# Patient Record
Sex: FEMALE | Race: WHITE | NOT HISPANIC OR LATINO | Employment: OTHER | URBAN - METROPOLITAN AREA
[De-identification: names, ages, dates, MRNs, and addresses within clinical notes are randomized per-mention and may not be internally consistent; named-entity substitution may affect disease eponyms.]

---

## 2017-04-03 ENCOUNTER — OFFICE VISIT (OUTPATIENT)
Dept: RETAIL CLINIC | Facility: CLINIC | Age: 77
End: 2017-04-03

## 2017-04-03 VITALS
OXYGEN SATURATION: 96 % | SYSTOLIC BLOOD PRESSURE: 140 MMHG | DIASTOLIC BLOOD PRESSURE: 82 MMHG | TEMPERATURE: 97.3 F | RESPIRATION RATE: 16 BRPM | HEART RATE: 65 BPM

## 2017-04-03 DIAGNOSIS — H66.001 ACUTE SUPPURATIVE OTITIS MEDIA OF RIGHT EAR WITHOUT SPONTANEOUS RUPTURE OF TYMPANIC MEMBRANE, RECURRENCE NOT SPECIFIED: Primary | ICD-10-CM

## 2017-04-03 DIAGNOSIS — J40 BRONCHITIS: ICD-10-CM

## 2017-04-03 PROBLEM — H92.09 EAR ACHE: Status: ACTIVE | Noted: 2017-04-03

## 2017-04-03 PROBLEM — R50.9 FEVER: Status: ACTIVE | Noted: 2017-04-03

## 2017-04-03 PROBLEM — R09.89 CHEST CONGESTION: Status: ACTIVE | Noted: 2017-04-03

## 2017-04-03 PROCEDURE — 99203 OFFICE O/P NEW LOW 30 MIN: CPT | Performed by: NURSE PRACTITIONER

## 2017-04-03 RX ORDER — BENZONATATE 100 MG/1
100 CAPSULE ORAL 3 TIMES DAILY PRN
Qty: 18 CAPSULE | Refills: 0 | Status: SHIPPED | OUTPATIENT
Start: 2017-04-03 | End: 2023-01-01 | Stop reason: HOSPADM

## 2017-04-03 RX ORDER — LANSOPRAZOLE 15 MG/1
15 CAPSULE, DELAYED RELEASE ORAL DAILY
COMMUNITY

## 2017-04-03 RX ORDER — ALBUTEROL SULFATE 90 UG/1
2 AEROSOL, METERED RESPIRATORY (INHALATION) EVERY 4 HOURS PRN
Qty: 1 INHALER | Refills: 0 | Status: SHIPPED | OUTPATIENT
Start: 2017-04-03

## 2017-04-03 RX ORDER — SIMVASTATIN 40 MG
40 TABLET ORAL NIGHTLY
COMMUNITY
End: 2023-01-01 | Stop reason: HOSPADM

## 2017-04-03 RX ORDER — AMOXICILLIN 875 MG/1
875 TABLET, COATED ORAL 2 TIMES DAILY
Qty: 20 TABLET | Refills: 0 | Status: SHIPPED | OUTPATIENT
Start: 2017-04-03 | End: 2023-01-01 | Stop reason: HOSPADM

## 2017-04-03 RX ORDER — PREDNISONE 1 MG/1
TABLET ORAL
Qty: 21 TABLET | Refills: 0 | Status: SHIPPED | OUTPATIENT
Start: 2017-04-03 | End: 2023-01-01 | Stop reason: HOSPADM

## 2017-04-03 RX ORDER — PHENYTOIN 50 MG/1
50 TABLET, CHEWABLE ORAL 3 TIMES DAILY
COMMUNITY
End: 2023-01-01 | Stop reason: HOSPADM

## 2017-04-03 NOTE — PROGRESS NOTES
See Bryant is a 77 y.o. female.     URI    This is a new problem. The current episode started in the past 7 days. The maximum temperature recorded prior to her arrival was 100.4 - 100.9 F. The fever has been present for 1 to 2 days. Associated symptoms include congestion, coughing, ear pain, headaches and wheezing. Associated symptoms comments: Right ear  Facial pressure. She has tried acetaminophen for the symptoms.   Cough   Associated symptoms include chills, ear pain, a fever, headaches, postnasal drip and wheezing.   Earache    There is pain in the right ear. This is a new problem. The current episode started yesterday. The maximum temperature recorded prior to her arrival was 100.4 - 100.9 F. The fever has been present for 1 to 2 days. The pain is at a severity of 5/10. The pain is moderate. Associated symptoms include coughing and headaches. Pertinent negatives include no ear discharge.        The following portions of the patient's history were reviewed and updated as appropriate: allergies, current medications, past family history, past medical history, past social history, past surgical history and problem list.    Review of Systems   Constitutional: Positive for chills and fever.   HENT: Positive for congestion, ear pain and postnasal drip. Negative for ear discharge.         Right ear   Eyes: Negative.    Respiratory: Positive for cough, chest tightness and wheezing.         Chest congestion   Cardiovascular: Negative.    Gastrointestinal: Negative.    Neurological: Positive for headaches.       Objective   Physical Exam   Constitutional: She is oriented to person, place, and time. She appears well-developed and well-nourished.   HENT:   Head: Normocephalic and atraumatic.   Right Ear: Tympanic membrane is injected, erythematous and bulging. Tympanic membrane mobility is abnormal.   Left Ear: Hearing and external ear normal.   Nose: Mucosal edema present.   Mouth/Throat: No oropharyngeal  exudate, posterior oropharyngeal edema, posterior oropharyngeal erythema or tonsillar abscesses.   Eyes: Pupils are equal, round, and reactive to light.   Neck: Normal range of motion.   Cardiovascular: Normal rate and regular rhythm.    Pulmonary/Chest: No respiratory distress. She has no decreased breath sounds. She has wheezes in the right upper field and the left upper field. She has rhonchi in the right upper field, the right middle field and the left upper field. She has no rales. She exhibits tenderness.   Abdominal: Soft. Bowel sounds are normal.   Neurological: She is oriented to person, place, and time.   Nursing note and vitals reviewed.      Assessment/Plan   Ina was seen today for uri, cough and earache.    Diagnoses and all orders for this visit:    Acute suppurative otitis media of right ear without spontaneous rupture of tympanic membrane, recurrence not specified  -     amoxicillin (AMOXIL) 875 MG tablet; Take 1 tablet by mouth 2 (Two) Times a Day.    Bronchitis  -     predniSONE (DELTASONE) 5 MG tablet; 5 mg pack  -     benzonatate (TESSALON PERLES) 100 MG capsule; Take 1 capsule by mouth 3 (Three) Times a Day As Needed for Cough.  -     albuterol (PROVENTIL HFA) 108 (90 BASE) MCG/ACT inhaler; Inhale 2 puffs Every 4 (Four) Hours As Needed for Wheezing or Shortness of Air.      Talked to the patient about the diagnosis and educate the patient and advise to visit to PCP if the symptoms worsens

## 2017-04-03 NOTE — PATIENT INSTRUCTIONS
Otitis Media, Adult  Otitis media is redness, soreness, and inflammation of the middle ear. Otitis media may be caused by allergies or, most commonly, by infection. Often it occurs as a complication of the common cold.  SIGNS AND SYMPTOMS  Symptoms of otitis media may include:  · Earache.  · Fever.  · Ringing in your ear.  · Headache.  · Leakage of fluid from the ear.  DIAGNOSIS  To diagnose otitis media, your health care provider will examine your ear with an otoscope. This is an instrument that allows your health care provider to see into your ear in order to examine your eardrum. Your health care provider also will ask you questions about your symptoms.  TREATMENT   Typically, otitis media resolves on its own within 3-5 days. Your health care provider may prescribe medicine to ease your symptoms of pain. If otitis media does not resolve within 5 days or is recurrent, your health care provider may prescribe antibiotic medicines if he or she suspects that a bacterial infection is the cause.  HOME CARE INSTRUCTIONS   · If you were prescribed an antibiotic medicine, finish it all even if you start to feel better.  · Take medicines only as directed by your health care provider.  · Keep all follow-up visits as directed by your health care provider.  SEEK MEDICAL CARE IF:  · You have otitis media only in one ear, or bleeding from your nose, or both.  · You notice a lump on your neck.  · You are not getting better in 3-5 days.  · You feel worse instead of better.  SEEK IMMEDIATE MEDICAL CARE IF:   · You have pain that is not controlled with medicine.  · You have swelling, redness, or pain around your ear or stiffness in your neck.  · You notice that part of your face is paralyzed.  · You notice that the bone behind your ear (mastoid) is tender when you touch it.  MAKE SURE YOU:   · Understand these instructions.  · Will watch your condition.  · Will get help right away if you are not doing well or get worse.     This  information is not intended to replace advice given to you by your health care provider. Make sure you discuss any questions you have with your health care provider.     Document Released: 09/22/2005 Document Revised: 01/08/2016 Document Reviewed: 07/15/2014  [employer] Interactive Patient Education ©2016 [employer]    Acute Bronchitis  Bronchitis is inflammation of the airways that extend from the windpipe into the lungs (bronchi). The inflammation often causes mucus to develop. This leads to a cough, which is the most common symptom of bronchitis.   In acute bronchitis, the condition usually develops suddenly and goes away over time, usually in a couple weeks. Smoking, allergies, and asthma can make bronchitis worse. Repeated episodes of bronchitis may cause further lung problems.   CAUSES  Acute bronchitis is most often caused by the same virus that causes a cold. The virus can spread from person to person (contagious) through coughing, sneezing, and touching contaminated objects.  SIGNS AND SYMPTOMS   · Cough.    · Fever.    · Coughing up mucus.    · Body aches.    · Chest congestion.    · Chills.    · Shortness of breath.    · Sore throat.    DIAGNOSIS   Acute bronchitis is usually diagnosed through a physical exam. Your health care provider will also ask you questions about your medical history. Tests, such as chest X-rays, are sometimes done to rule out other conditions.   TREATMENT   Acute bronchitis usually goes away in a couple weeks. Oftentimes, no medical treatment is necessary. Medicines are sometimes given for relief of fever or cough. Antibiotic medicines are usually not needed but may be prescribed in certain situations. In some cases, an inhaler may be recommended to help reduce shortness of breath and control the cough. A cool mist vaporizer may also be used to help thin bronchial secretions and make it easier to clear the chest.   HOME CARE INSTRUCTIONS  · Get plenty of rest.    · Drink enough  fluids to keep your urine clear or pale yellow (unless you have a medical condition that requires fluid restriction). Increasing fluids may help thin your respiratory secretions (sputum) and reduce chest congestion, and it will prevent dehydration.    · Take medicines only as directed by your health care provider.  · If you were prescribed an antibiotic medicine, finish it all even if you start to feel better.  · Avoid smoking and secondhand smoke. Exposure to cigarette smoke or irritating chemicals will make bronchitis worse. If you are a smoker, consider using nicotine gum or skin patches to help control withdrawal symptoms. Quitting smoking will help your lungs heal faster.    · Reduce the chances of another bout of acute bronchitis by washing your hands frequently, avoiding people with cold symptoms, and trying not to touch your hands to your mouth, nose, or eyes.    · Keep all follow-up visits as directed by your health care provider.    SEEK MEDICAL CARE IF:  Your symptoms do not improve after 1 week of treatment.   SEEK IMMEDIATE MEDICAL CARE IF:  · You develop an increased fever or chills.    · You have chest pain.    · You have severe shortness of breath.  · You have bloody sputum.    · You develop dehydration.  · You faint or repeatedly feel like you are going to pass out.  · You develop repeated vomiting.  · You develop a severe headache.  MAKE SURE YOU:   · Understand these instructions.  · Will watch your condition.  · Will get help right away if you are not doing well or get worse.     This information is not intended to replace advice given to you by your health care provider. Make sure you discuss any questions you have with your health care provider.     Document Released: 01/25/2006 Document Revised: 01/08/2016 Document Reviewed: 06/10/2014  [employer] Patient Education ©2016 [employer]  Talked to the patient about the diagnosis and educate the patient and advise to visit to PCP if the  symptoms worsens

## 2023-01-01 ENCOUNTER — APPOINTMENT (OUTPATIENT)
Dept: CARDIOLOGY | Facility: HOSPITAL | Age: 83
DRG: 65 | End: 2023-01-01
Payer: MEDICARE

## 2023-01-01 ENCOUNTER — APPOINTMENT (OUTPATIENT)
Dept: MRI IMAGING | Facility: HOSPITAL | Age: 83
DRG: 65 | End: 2023-01-01
Payer: MEDICARE

## 2023-01-01 ENCOUNTER — APPOINTMENT (OUTPATIENT)
Dept: GENERAL RADIOLOGY | Facility: HOSPITAL | Age: 83
DRG: 65 | End: 2023-01-01
Payer: MEDICARE

## 2023-01-01 ENCOUNTER — APPOINTMENT (OUTPATIENT)
Dept: GENERAL RADIOLOGY | Facility: HOSPITAL | Age: 83
DRG: 297 | End: 2023-01-01
Payer: MEDICARE

## 2023-01-01 ENCOUNTER — APPOINTMENT (OUTPATIENT)
Dept: CT IMAGING | Facility: HOSPITAL | Age: 83
DRG: 65 | End: 2023-01-01
Payer: MEDICARE

## 2023-01-01 ENCOUNTER — HOSPITAL ENCOUNTER (INPATIENT)
Facility: HOSPITAL | Age: 83
LOS: 6 days | Discharge: NURSING, REHAB OR LONG-TERM CARE | DRG: 65 | End: 2023-02-28
Attending: EMERGENCY MEDICINE
Payer: MEDICARE

## 2023-01-01 ENCOUNTER — APPOINTMENT (OUTPATIENT)
Dept: CT IMAGING | Facility: HOSPITAL | Age: 83
DRG: 297 | End: 2023-01-01
Payer: MEDICARE

## 2023-01-01 ENCOUNTER — HOSPITAL ENCOUNTER (INPATIENT)
Facility: HOSPITAL | Age: 83
LOS: 2 days | DRG: 297 | End: 2023-03-12
Attending: EMERGENCY MEDICINE | Admitting: HOSPITALIST
Payer: MEDICARE

## 2023-01-01 ENCOUNTER — HOSPITAL ENCOUNTER (EMERGENCY)
Facility: HOSPITAL | Age: 83
Discharge: HOME | DRG: 65 | End: 2023-02-22
Attending: EMERGENCY MEDICINE
Payer: MEDICARE

## 2023-01-01 VITALS
TEMPERATURE: 97.6 F | HEIGHT: 62 IN | RESPIRATION RATE: 16 BRPM | HEART RATE: 61 BPM | OXYGEN SATURATION: 97 % | WEIGHT: 162 LBS | DIASTOLIC BLOOD PRESSURE: 62 MMHG | SYSTOLIC BLOOD PRESSURE: 133 MMHG | BODY MASS INDEX: 29.81 KG/M2

## 2023-01-01 VITALS
BODY MASS INDEX: 29.81 KG/M2 | RESPIRATION RATE: 16 BRPM | HEART RATE: 111 BPM | HEIGHT: 62 IN | OXYGEN SATURATION: 89 % | SYSTOLIC BLOOD PRESSURE: 128 MMHG | DIASTOLIC BLOOD PRESSURE: 66 MMHG | WEIGHT: 162 LBS | TEMPERATURE: 97.7 F

## 2023-01-01 VITALS
OXYGEN SATURATION: 96 % | HEART RATE: 38 BPM | RESPIRATION RATE: 18 BRPM | TEMPERATURE: 97.4 F | DIASTOLIC BLOOD PRESSURE: 64 MMHG | SYSTOLIC BLOOD PRESSURE: 110 MMHG

## 2023-01-01 DIAGNOSIS — E87.6 HYPOKALEMIA: ICD-10-CM

## 2023-01-01 DIAGNOSIS — R00.1 SYMPTOMATIC BRADYCARDIA: ICD-10-CM

## 2023-01-01 DIAGNOSIS — E83.42 HYPOMAGNESEMIA: ICD-10-CM

## 2023-01-01 DIAGNOSIS — I95.9 SYMPTOMATIC HYPOTENSION: ICD-10-CM

## 2023-01-01 DIAGNOSIS — S22.43XA MULTIPLE FRACTURES OF RIBS, BILATERAL, INIT FOR CLOS FX: ICD-10-CM

## 2023-01-01 DIAGNOSIS — Z86.69 HISTORY OF SEIZURE DISORDER: ICD-10-CM

## 2023-01-01 DIAGNOSIS — R55 SYNCOPE AND COLLAPSE: Primary | ICD-10-CM

## 2023-01-01 DIAGNOSIS — N39.0 URINARY TRACT INFECTION WITHOUT HEMATURIA, SITE UNSPECIFIED: ICD-10-CM

## 2023-01-01 DIAGNOSIS — I48.91 ATRIAL FIBRILLATION WITH SLOW VENTRICULAR RESPONSE: Primary | ICD-10-CM

## 2023-01-01 DIAGNOSIS — R77.8 ELEVATED TROPONIN: ICD-10-CM

## 2023-01-01 DIAGNOSIS — Z86.79 HISTORY OF ATRIAL FIBRILLATION: ICD-10-CM

## 2023-01-01 DIAGNOSIS — E83.51 HYPOCALCEMIA: ICD-10-CM

## 2023-01-01 DIAGNOSIS — R56.9 SEIZURES: ICD-10-CM

## 2023-01-01 DIAGNOSIS — S22.20XA CLOSED FRACTURE OF STERNUM, UNSPECIFIED PORTION OF STERNUM, INITIAL ENCOUNTER: ICD-10-CM

## 2023-01-01 DIAGNOSIS — R51.9 ACUTE NONINTRACTABLE HEADACHE, UNSPECIFIED HEADACHE TYPE: ICD-10-CM

## 2023-01-01 LAB
ALBUMIN SERPL-MCNC: 2.3 G/DL (ref 3.5–5.2)
ALBUMIN SERPL-MCNC: 3.1 G/DL (ref 3.5–5.2)
ALBUMIN/GLOB SERPL: 1.2 G/DL
ALBUMIN/GLOB SERPL: 1.2 G/DL
ALP SERPL-CCNC: 124 U/L (ref 39–117)
ALP SERPL-CCNC: 88 U/L (ref 39–117)
ALT SERPL W P-5'-P-CCNC: 11 U/L (ref 1–33)
ALT SERPL W P-5'-P-CCNC: 11 U/L (ref 1–33)
ANION GAP SERPL CALCULATED.3IONS-SCNC: 10.8 MMOL/L (ref 5–15)
ANION GAP SERPL CALCULATED.3IONS-SCNC: 7 MMOL/L (ref 5–15)
ANION GAP SERPL CALCULATED.3IONS-SCNC: 7.4 MMOL/L (ref 5–15)
ANION GAP SERPL CALCULATED.3IONS-SCNC: 8 MMOL/L (ref 5–15)
ANION GAP SERPL CALCULATED.3IONS-SCNC: 9 MMOL/L (ref 5–15)
ANION GAP SERPL CALCULATED.3IONS-SCNC: 9.4 MMOL/L (ref 5–15)
AORTIC DIMENSIONLESS INDEX: 0.8 (DI)
APTT PPP: 24.6 SECONDS (ref 22.7–35.4)
ARTERIAL PATENCY WRIST A: POSITIVE
ASCENDING AORTA: 2.6 CM
AST SERPL-CCNC: 15 U/L (ref 1–32)
AST SERPL-CCNC: 16 U/L (ref 1–32)
ATMOSPHERIC PRESS: 749.2 MMHG
BACTERIA SPEC AEROBE CULT: ABNORMAL
BACTERIA UR QL AUTO: ABNORMAL /HPF
BACTERIA UR QL AUTO: ABNORMAL /HPF
BASE EXCESS BLDA CALC-SCNC: -0.9 MMOL/L (ref 0–2)
BASOPHILS # BLD AUTO: 0.02 10*3/MM3 (ref 0–0.2)
BASOPHILS # BLD AUTO: 0.03 10*3/MM3 (ref 0–0.2)
BASOPHILS # BLD AUTO: 0.05 10*3/MM3 (ref 0–0.2)
BASOPHILS # BLD AUTO: 0.06 10*3/MM3 (ref 0–0.2)
BASOPHILS # BLD AUTO: 0.07 10*3/MM3 (ref 0–0.2)
BASOPHILS # BLD AUTO: 0.07 10*3/MM3 (ref 0–0.2)
BASOPHILS NFR BLD AUTO: 0.3 % (ref 0–1.5)
BASOPHILS NFR BLD AUTO: 0.6 % (ref 0–1.5)
BASOPHILS NFR BLD AUTO: 0.7 % (ref 0–1.5)
BASOPHILS NFR BLD AUTO: 0.8 % (ref 0–1.5)
BDY SITE: ABNORMAL
BH CV ECHO MEAS - ACS: 1.87 CM
BH CV ECHO MEAS - AI P1/2T: 439.4 MSEC
BH CV ECHO MEAS - AO MAX PG: 4.9 MMHG
BH CV ECHO MEAS - AO MEAN PG: 2.7 MMHG
BH CV ECHO MEAS - AO ROOT DIAM: 2.9 CM
BH CV ECHO MEAS - AO V2 MAX: 110.9 CM/SEC
BH CV ECHO MEAS - AO V2 VTI: 27.9 CM
BH CV ECHO MEAS - AVA(I,D): 1.96 CM2
BH CV ECHO MEAS - EDV(CUBED): 113.2 ML
BH CV ECHO MEAS - EDV(MOD-SP2): 78 ML
BH CV ECHO MEAS - EDV(MOD-SP4): 62 ML
BH CV ECHO MEAS - EF(MOD-BP): 63.4 %
BH CV ECHO MEAS - EF(MOD-SP2): 65.4 %
BH CV ECHO MEAS - EF(MOD-SP4): 62.9 %
BH CV ECHO MEAS - EF_3D-VOL: 67 %
BH CV ECHO MEAS - ESV(CUBED): 25.8 ML
BH CV ECHO MEAS - ESV(MOD-SP2): 27 ML
BH CV ECHO MEAS - ESV(MOD-SP4): 23 ML
BH CV ECHO MEAS - FS: 38.9 %
BH CV ECHO MEAS - IVS/LVPW: 0.92 CM
BH CV ECHO MEAS - IVSD: 0.88 CM
BH CV ECHO MEAS - LAT PEAK E' VEL: 8.9 CM/SEC
BH CV ECHO MEAS - LV DIASTOLIC VOL/BSA (35-75): 33.9 CM2
BH CV ECHO MEAS - LV MASS(C)D: 153.1 GRAMS
BH CV ECHO MEAS - LV MAX PG: 2.7 MMHG
BH CV ECHO MEAS - LV MEAN PG: 1.32 MMHG
BH CV ECHO MEAS - LV SYSTOLIC VOL/BSA (12-30): 12.6 CM2
BH CV ECHO MEAS - LV V1 MAX: 82.7 CM/SEC
BH CV ECHO MEAS - LV V1 VTI: 21.3 CM
BH CV ECHO MEAS - LVIDD: 4.8 CM
BH CV ECHO MEAS - LVIDS: 3 CM
BH CV ECHO MEAS - LVOT AREA: 2.6 CM2
BH CV ECHO MEAS - LVOT DIAM: 1.81 CM
BH CV ECHO MEAS - LVPWD: 0.95 CM
BH CV ECHO MEAS - MED PEAK E' VEL: 5 CM/SEC
BH CV ECHO MEAS - MR MAX PG: 87.1 MMHG
BH CV ECHO MEAS - MR MAX VEL: 466.7 CM/SEC
BH CV ECHO MEAS - MV A DUR: 0.11 SEC
BH CV ECHO MEAS - MV A MAX VEL: 61.3 CM/SEC
BH CV ECHO MEAS - MV DEC SLOPE: 293.8 CM/SEC2
BH CV ECHO MEAS - MV DEC TIME: 0.2 MSEC
BH CV ECHO MEAS - MV E MAX VEL: 72.8 CM/SEC
BH CV ECHO MEAS - MV E/A: 1.19
BH CV ECHO MEAS - MV MAX PG: 2.6 MMHG
BH CV ECHO MEAS - MV MEAN PG: 0.78 MMHG
BH CV ECHO MEAS - MV P1/2T: 77.8 MSEC
BH CV ECHO MEAS - MV V2 VTI: 27.1 CM
BH CV ECHO MEAS - MVA(P1/2T): 2.8 CM2
BH CV ECHO MEAS - MVA(VTI): 2.01 CM2
BH CV ECHO MEAS - PULM A REVS DUR: 0.11 SEC
BH CV ECHO MEAS - PULM A REVS VEL: 18.4 CM/SEC
BH CV ECHO MEAS - PULM DIAS VEL: 40.3 CM/SEC
BH CV ECHO MEAS - PULM S/D: 1.12
BH CV ECHO MEAS - PULM SYS VEL: 45 CM/SEC
BH CV ECHO MEAS - RAP SYSTOLE: 3 MMHG
BH CV ECHO MEAS - RVOT DIAM: 2.14 CM
BH CV ECHO MEAS - RVSP: 28.1 MMHG
BH CV ECHO MEAS - SI(MOD-SP2): 27.9 ML/M2
BH CV ECHO MEAS - SI(MOD-SP4): 21.3 ML/M2
BH CV ECHO MEAS - SV(LVOT): 54.5 ML
BH CV ECHO MEAS - SV(MOD-SP2): 51 ML
BH CV ECHO MEAS - SV(MOD-SP4): 39 ML
BH CV ECHO MEAS - TAPSE (>1.6): 2.2 CM
BH CV ECHO MEAS - TR MAX PG: 25.1 MMHG
BH CV ECHO MEAS - TR MAX VEL: 250.6 CM/SEC
BH CV ECHO MEASUREMENTS AVERAGE E/E' RATIO: 10.47
BH CV ECHO SHUNT ASSESSMENT PERFORMED (HIDDEN SCRIPTING): 1
BH CV XLRA - RV BASE: 3.3 CM
BH CV XLRA - RV LENGTH: 5.8 CM
BH CV XLRA - RV MID: 3 CM
BH CV XLRA - TDI S': 10.3 CM/SEC
BILIRUB SERPL-MCNC: 0.2 MG/DL (ref 0–1.2)
BILIRUB SERPL-MCNC: 0.2 MG/DL (ref 0–1.2)
BILIRUB UR QL STRIP: NEGATIVE
BILIRUB UR QL STRIP: NEGATIVE
BUN SERPL-MCNC: 10 MG/DL (ref 8–23)
BUN SERPL-MCNC: 10 MG/DL (ref 8–23)
BUN SERPL-MCNC: 13 MG/DL (ref 8–23)
BUN SERPL-MCNC: 13 MG/DL (ref 8–23)
BUN SERPL-MCNC: 16 MG/DL (ref 8–23)
BUN SERPL-MCNC: 16 MG/DL (ref 8–23)
BUN SERPL-MCNC: 18 MG/DL (ref 8–23)
BUN SERPL-MCNC: 6 MG/DL (ref 8–23)
BUN SERPL-MCNC: 7 MG/DL (ref 8–23)
BUN/CREAT SERPL: 12.3 (ref 7–25)
BUN/CREAT SERPL: 16.4 (ref 7–25)
BUN/CREAT SERPL: 18.5 (ref 7–25)
BUN/CREAT SERPL: 19.3 (ref 7–25)
BUN/CREAT SERPL: 21 (ref 7–25)
BUN/CREAT SERPL: 21 (ref 7–25)
BUN/CREAT SERPL: 21.1 (ref 7–25)
BUN/CREAT SERPL: 23.1 (ref 7–25)
BUN/CREAT SERPL: 9.2 (ref 7–25)
CA-I BLD-MCNC: 4.9 MG/DL (ref 4.6–5.4)
CA-I SERPL ISE-MCNC: 1.22 MMOL/L (ref 1.15–1.35)
CALCIUM SPEC-SCNC: 5.4 MG/DL (ref 8.6–10.5)
CALCIUM SPEC-SCNC: 7.8 MG/DL (ref 8.6–10.5)
CALCIUM SPEC-SCNC: 8.1 MG/DL (ref 8.6–10.5)
CALCIUM SPEC-SCNC: 8.3 MG/DL (ref 8.6–10.5)
CALCIUM SPEC-SCNC: 8.3 MG/DL (ref 8.6–10.5)
CALCIUM SPEC-SCNC: 8.4 MG/DL (ref 8.6–10.5)
CALCIUM SPEC-SCNC: 8.7 MG/DL (ref 8.6–10.5)
CHLORIDE SERPL-SCNC: 105 MMOL/L (ref 98–107)
CHLORIDE SERPL-SCNC: 108 MMOL/L (ref 98–107)
CHLORIDE SERPL-SCNC: 109 MMOL/L (ref 98–107)
CHLORIDE SERPL-SCNC: 110 MMOL/L (ref 98–107)
CHLORIDE SERPL-SCNC: 116 MMOL/L (ref 98–107)
CHLORIDE SERPL-SCNC: 97 MMOL/L (ref 98–107)
CHOLEST SERPL-MCNC: 166 MG/DL (ref 0–200)
CK SERPL-CCNC: 51 U/L (ref 20–180)
CLARITY UR: CLEAR
CLARITY UR: CLEAR
CO2 SERPL-SCNC: 18.2 MMOL/L (ref 22–29)
CO2 SERPL-SCNC: 19 MMOL/L (ref 22–29)
CO2 SERPL-SCNC: 22 MMOL/L (ref 22–29)
CO2 SERPL-SCNC: 23 MMOL/L (ref 22–29)
CO2 SERPL-SCNC: 23.6 MMOL/L (ref 22–29)
CO2 SERPL-SCNC: 25 MMOL/L (ref 22–29)
CO2 SERPL-SCNC: 25 MMOL/L (ref 22–29)
CO2 SERPL-SCNC: 25.6 MMOL/L (ref 22–29)
CO2 SERPL-SCNC: 26 MMOL/L (ref 22–29)
COLOR UR: YELLOW
COLOR UR: YELLOW
CREAT SERPL-MCNC: 0.54 MG/DL (ref 0.57–1)
CREAT SERPL-MCNC: 0.57 MG/DL (ref 0.57–1)
CREAT SERPL-MCNC: 0.61 MG/DL (ref 0.57–1)
CREAT SERPL-MCNC: 0.62 MG/DL (ref 0.57–1)
CREAT SERPL-MCNC: 0.62 MG/DL (ref 0.57–1)
CREAT SERPL-MCNC: 0.65 MG/DL (ref 0.57–1)
CREAT SERPL-MCNC: 0.76 MG/DL (ref 0.57–1)
CREAT SERPL-MCNC: 0.78 MG/DL (ref 0.57–1)
CREAT SERPL-MCNC: 0.83 MG/DL (ref 0.57–1)
DEPRECATED RDW RBC AUTO: 46 FL (ref 37–54)
DEPRECATED RDW RBC AUTO: 46.8 FL (ref 37–54)
DEPRECATED RDW RBC AUTO: 47.1 FL (ref 37–54)
DEPRECATED RDW RBC AUTO: 47.2 FL (ref 37–54)
DEPRECATED RDW RBC AUTO: 47.4 FL (ref 37–54)
DEPRECATED RDW RBC AUTO: 47.4 FL (ref 37–54)
DEPRECATED RDW RBC AUTO: 48.3 FL (ref 37–54)
DEPRECATED RDW RBC AUTO: 49.1 FL (ref 37–54)
DEPRECATED RDW RBC AUTO: 49.5 FL (ref 37–54)
DIGOXIN SERPL-MCNC: <0.3 NG/ML (ref 0.6–1.2)
EGFRCR SERPLBLD CKD-EPI 2021: 70 ML/MIN/1.73
EGFRCR SERPLBLD CKD-EPI 2021: 75.9 ML/MIN/1.73
EGFRCR SERPLBLD CKD-EPI 2021: 80.3 ML/MIN/1.73
EGFRCR SERPLBLD CKD-EPI 2021: 88 ML/MIN/1.73
EGFRCR SERPLBLD CKD-EPI 2021: 89 ML/MIN/1.73
EGFRCR SERPLBLD CKD-EPI 2021: 89 ML/MIN/1.73
EGFRCR SERPLBLD CKD-EPI 2021: 89.4 ML/MIN/1.73
EGFRCR SERPLBLD CKD-EPI 2021: 90.9 ML/MIN/1.73
EGFRCR SERPLBLD CKD-EPI 2021: 91.5 ML/MIN/1.73
EOSINOPHIL # BLD AUTO: 0 10*3/MM3 (ref 0–0.4)
EOSINOPHIL # BLD AUTO: 0.02 10*3/MM3 (ref 0–0.4)
EOSINOPHIL # BLD AUTO: 0.16 10*3/MM3 (ref 0–0.4)
EOSINOPHIL # BLD AUTO: 0.2 10*3/MM3 (ref 0–0.4)
EOSINOPHIL # BLD AUTO: 0.21 10*3/MM3 (ref 0–0.4)
EOSINOPHIL # BLD AUTO: 0.21 10*3/MM3 (ref 0–0.4)
EOSINOPHIL # BLD AUTO: 0.24 10*3/MM3 (ref 0–0.4)
EOSINOPHIL # BLD AUTO: 0.31 10*3/MM3 (ref 0–0.4)
EOSINOPHIL NFR BLD AUTO: 0 % (ref 0.3–6.2)
EOSINOPHIL NFR BLD AUTO: 0.4 % (ref 0.3–6.2)
EOSINOPHIL NFR BLD AUTO: 1.7 % (ref 0.3–6.2)
EOSINOPHIL NFR BLD AUTO: 1.8 % (ref 0.3–6.2)
EOSINOPHIL NFR BLD AUTO: 2 % (ref 0.3–6.2)
EOSINOPHIL NFR BLD AUTO: 2.6 % (ref 0.3–6.2)
EOSINOPHIL NFR BLD AUTO: 3.5 % (ref 0.3–6.2)
EOSINOPHIL NFR BLD AUTO: 4 % (ref 0.3–6.2)
ERYTHROCYTE [DISTWIDTH] IN BLOOD BY AUTOMATED COUNT: 13 % (ref 12.3–15.4)
ERYTHROCYTE [DISTWIDTH] IN BLOOD BY AUTOMATED COUNT: 13.2 % (ref 12.3–15.4)
ERYTHROCYTE [DISTWIDTH] IN BLOOD BY AUTOMATED COUNT: 13.3 % (ref 12.3–15.4)
ERYTHROCYTE [DISTWIDTH] IN BLOOD BY AUTOMATED COUNT: 13.3 % (ref 12.3–15.4)
ERYTHROCYTE [DISTWIDTH] IN BLOOD BY AUTOMATED COUNT: 13.4 % (ref 12.3–15.4)
ERYTHROCYTE [DISTWIDTH] IN BLOOD BY AUTOMATED COUNT: 13.7 % (ref 12.3–15.4)
GAS FLOW AIRWAY: 4 LPM
GEN 5 2HR TROPONIN T REFLEX: 29 NG/L
GLOBULIN UR ELPH-MCNC: 2 GM/DL
GLOBULIN UR ELPH-MCNC: 2.5 GM/DL
GLUCOSE BLDC GLUCOMTR-MCNC: 101 MG/DL (ref 70–130)
GLUCOSE BLDC GLUCOMTR-MCNC: 112 MG/DL (ref 70–130)
GLUCOSE BLDC GLUCOMTR-MCNC: 115 MG/DL (ref 70–130)
GLUCOSE BLDC GLUCOMTR-MCNC: 119 MG/DL (ref 70–130)
GLUCOSE BLDC GLUCOMTR-MCNC: 122 MG/DL (ref 70–130)
GLUCOSE BLDC GLUCOMTR-MCNC: 98 MG/DL (ref 70–130)
GLUCOSE SERPL-MCNC: 102 MG/DL (ref 65–99)
GLUCOSE SERPL-MCNC: 111 MG/DL (ref 65–99)
GLUCOSE SERPL-MCNC: 118 MG/DL (ref 65–99)
GLUCOSE SERPL-MCNC: 124 MG/DL (ref 65–99)
GLUCOSE SERPL-MCNC: 124 MG/DL (ref 65–99)
GLUCOSE SERPL-MCNC: 92 MG/DL (ref 65–99)
GLUCOSE SERPL-MCNC: 94 MG/DL (ref 65–99)
GLUCOSE SERPL-MCNC: 97 MG/DL (ref 65–99)
GLUCOSE SERPL-MCNC: 97 MG/DL (ref 65–99)
GLUCOSE UR STRIP-MCNC: NEGATIVE MG/DL
GLUCOSE UR STRIP-MCNC: NEGATIVE MG/DL
HBA1C MFR BLD: 5.4 % (ref 4.8–5.6)
HCO3 BLDA-SCNC: 23.6 MMOL/L (ref 22–28)
HCT VFR BLD AUTO: 34.3 % (ref 34–46.6)
HCT VFR BLD AUTO: 34.7 % (ref 34–46.6)
HCT VFR BLD AUTO: 35.3 % (ref 34–46.6)
HCT VFR BLD AUTO: 35.8 % (ref 34–46.6)
HCT VFR BLD AUTO: 35.8 % (ref 34–46.6)
HCT VFR BLD AUTO: 36.5 % (ref 34–46.6)
HCT VFR BLD AUTO: 36.6 % (ref 34–46.6)
HCT VFR BLD AUTO: 36.7 % (ref 34–46.6)
HCT VFR BLD AUTO: 38.6 % (ref 34–46.6)
HDLC SERPL-MCNC: 42 MG/DL (ref 40–60)
HGB BLD-MCNC: 11.7 G/DL (ref 12–15.9)
HGB BLD-MCNC: 11.8 G/DL (ref 12–15.9)
HGB BLD-MCNC: 12 G/DL (ref 12–15.9)
HGB BLD-MCNC: 12.1 G/DL (ref 12–15.9)
HGB BLD-MCNC: 12.3 G/DL (ref 12–15.9)
HGB BLD-MCNC: 12.5 G/DL (ref 12–15.9)
HGB BLD-MCNC: 12.7 G/DL (ref 12–15.9)
HGB UR QL STRIP.AUTO: ABNORMAL
HGB UR QL STRIP.AUTO: NEGATIVE
HYALINE CASTS UR QL AUTO: ABNORMAL /LPF
HYALINE CASTS UR QL AUTO: ABNORMAL /LPF
IMM GRANULOCYTES # BLD AUTO: 0.03 10*3/MM3 (ref 0–0.05)
IMM GRANULOCYTES # BLD AUTO: 0.04 10*3/MM3 (ref 0–0.05)
IMM GRANULOCYTES # BLD AUTO: 0.04 10*3/MM3 (ref 0–0.05)
IMM GRANULOCYTES # BLD AUTO: 0.05 10*3/MM3 (ref 0–0.05)
IMM GRANULOCYTES # BLD AUTO: 0.05 10*3/MM3 (ref 0–0.05)
IMM GRANULOCYTES # BLD AUTO: 0.09 10*3/MM3 (ref 0–0.05)
IMM GRANULOCYTES # BLD AUTO: 0.09 10*3/MM3 (ref 0–0.05)
IMM GRANULOCYTES # BLD AUTO: 0.1 10*3/MM3 (ref 0–0.05)
IMM GRANULOCYTES NFR BLD AUTO: 0.4 % (ref 0–0.5)
IMM GRANULOCYTES NFR BLD AUTO: 0.5 % (ref 0–0.5)
IMM GRANULOCYTES NFR BLD AUTO: 0.6 % (ref 0–0.5)
IMM GRANULOCYTES NFR BLD AUTO: 0.8 % (ref 0–0.5)
IMM GRANULOCYTES NFR BLD AUTO: 1 % (ref 0–0.5)
IMM GRANULOCYTES NFR BLD AUTO: 1.8 % (ref 0–0.5)
INR PPP: 1.02 (ref 0.9–1.1)
KETONES UR QL STRIP: NEGATIVE
KETONES UR QL STRIP: NEGATIVE
LDLC SERPL CALC-MCNC: 98 MG/DL (ref 0–100)
LDLC/HDLC SERPL: 2.26 {RATIO}
LEFT ATRIUM VOLUME INDEX: 30.3 ML/M2
LEUKOCYTE ESTERASE UR QL STRIP.AUTO: ABNORMAL
LEUKOCYTE ESTERASE UR QL STRIP.AUTO: NEGATIVE
LYMPHOCYTES # BLD AUTO: 1.47 10*3/MM3 (ref 0.7–3.1)
LYMPHOCYTES # BLD AUTO: 1.93 10*3/MM3 (ref 0.7–3.1)
LYMPHOCYTES # BLD AUTO: 2.33 10*3/MM3 (ref 0.7–3.1)
LYMPHOCYTES # BLD AUTO: 2.6 10*3/MM3 (ref 0.7–3.1)
LYMPHOCYTES # BLD AUTO: 3.1 10*3/MM3 (ref 0.7–3.1)
LYMPHOCYTES # BLD AUTO: 3.11 10*3/MM3 (ref 0.7–3.1)
LYMPHOCYTES # BLD AUTO: 3.72 10*3/MM3 (ref 0.7–3.1)
LYMPHOCYTES # BLD AUTO: 4.48 10*3/MM3 (ref 0.7–3.1)
LYMPHOCYTES NFR BLD AUTO: 20.3 % (ref 19.6–45.3)
LYMPHOCYTES NFR BLD AUTO: 29.8 % (ref 19.6–45.3)
LYMPHOCYTES NFR BLD AUTO: 33.2 % (ref 19.6–45.3)
LYMPHOCYTES NFR BLD AUTO: 33.8 % (ref 19.6–45.3)
LYMPHOCYTES NFR BLD AUTO: 37.6 % (ref 19.6–45.3)
LYMPHOCYTES NFR BLD AUTO: 38.5 % (ref 19.6–45.3)
LYMPHOCYTES NFR BLD AUTO: 38.7 % (ref 19.6–45.3)
LYMPHOCYTES NFR BLD AUTO: 40.6 % (ref 19.6–45.3)
MAGNESIUM SERPL-MCNC: 1.5 MG/DL (ref 1.6–2.4)
MAGNESIUM SERPL-MCNC: 2.2 MG/DL (ref 1.6–2.4)
MAGNESIUM SERPL-MCNC: 2.2 MG/DL (ref 1.6–2.4)
MAXIMAL PREDICTED HEART RATE: 138 BPM
MCH RBC QN AUTO: 31.1 PG (ref 26.6–33)
MCH RBC QN AUTO: 31.2 PG (ref 26.6–33)
MCH RBC QN AUTO: 32.3 PG (ref 26.6–33)
MCH RBC QN AUTO: 32.5 PG (ref 26.6–33)
MCH RBC QN AUTO: 32.7 PG (ref 26.6–33)
MCH RBC QN AUTO: 32.9 PG (ref 26.6–33)
MCHC RBC AUTO-ENTMCNC: 32.2 G/DL (ref 31.5–35.7)
MCHC RBC AUTO-ENTMCNC: 32.9 G/DL (ref 31.5–35.7)
MCHC RBC AUTO-ENTMCNC: 33.4 G/DL (ref 31.5–35.7)
MCHC RBC AUTO-ENTMCNC: 33.5 G/DL (ref 31.5–35.7)
MCHC RBC AUTO-ENTMCNC: 33.7 G/DL (ref 31.5–35.7)
MCHC RBC AUTO-ENTMCNC: 33.8 G/DL (ref 31.5–35.7)
MCHC RBC AUTO-ENTMCNC: 34 G/DL (ref 31.5–35.7)
MCHC RBC AUTO-ENTMCNC: 34.1 G/DL (ref 31.5–35.7)
MCHC RBC AUTO-ENTMCNC: 34.1 G/DL (ref 31.5–35.7)
MCV RBC AUTO: 94.6 FL (ref 79–97)
MCV RBC AUTO: 95.8 FL (ref 79–97)
MCV RBC AUTO: 96.1 FL (ref 79–97)
MCV RBC AUTO: 96.1 FL (ref 79–97)
MCV RBC AUTO: 96.3 FL (ref 79–97)
MCV RBC AUTO: 96.8 FL (ref 79–97)
MCV RBC AUTO: 96.8 FL (ref 79–97)
MCV RBC AUTO: 97.2 FL (ref 79–97)
MCV RBC AUTO: 97.5 FL (ref 79–97)
MODALITY: ABNORMAL
MONOCYTES # BLD AUTO: 0.63 10*3/MM3 (ref 0.1–0.9)
MONOCYTES # BLD AUTO: 0.66 10*3/MM3 (ref 0.1–0.9)
MONOCYTES # BLD AUTO: 0.71 10*3/MM3 (ref 0.1–0.9)
MONOCYTES # BLD AUTO: 0.75 10*3/MM3 (ref 0.1–0.9)
MONOCYTES # BLD AUTO: 0.81 10*3/MM3 (ref 0.1–0.9)
MONOCYTES # BLD AUTO: 0.86 10*3/MM3 (ref 0.1–0.9)
MONOCYTES # BLD AUTO: 0.88 10*3/MM3 (ref 0.1–0.9)
MONOCYTES # BLD AUTO: 0.91 10*3/MM3 (ref 0.1–0.9)
MONOCYTES NFR BLD AUTO: 11.9 % (ref 5–12)
MONOCYTES NFR BLD AUTO: 15.8 % (ref 5–12)
MONOCYTES NFR BLD AUTO: 7.4 % (ref 5–12)
MONOCYTES NFR BLD AUTO: 8 % (ref 5–12)
MONOCYTES NFR BLD AUTO: 8.4 % (ref 5–12)
MONOCYTES NFR BLD AUTO: 8.7 % (ref 5–12)
MONOCYTES NFR BLD AUTO: 9.1 % (ref 5–12)
MONOCYTES NFR BLD AUTO: 9.3 % (ref 5–12)
NEUTROPHILS NFR BLD AUTO: 2.25 10*3/MM3 (ref 1.7–7)
NEUTROPHILS NFR BLD AUTO: 3.6 10*3/MM3 (ref 1.7–7)
NEUTROPHILS NFR BLD AUTO: 3.87 10*3/MM3 (ref 1.7–7)
NEUTROPHILS NFR BLD AUTO: 4.14 10*3/MM3 (ref 1.7–7)
NEUTROPHILS NFR BLD AUTO: 4.86 10*3/MM3 (ref 1.7–7)
NEUTROPHILS NFR BLD AUTO: 4.95 10*3/MM3 (ref 1.7–7)
NEUTROPHILS NFR BLD AUTO: 43.8 % (ref 42.7–76)
NEUTROPHILS NFR BLD AUTO: 48.2 % (ref 42.7–76)
NEUTROPHILS NFR BLD AUTO: 48.3 % (ref 42.7–76)
NEUTROPHILS NFR BLD AUTO: 5.32 10*3/MM3 (ref 1.7–7)
NEUTROPHILS NFR BLD AUTO: 51.3 % (ref 42.7–76)
NEUTROPHILS NFR BLD AUTO: 52.3 % (ref 42.7–76)
NEUTROPHILS NFR BLD AUTO: 53 % (ref 42.7–76)
NEUTROPHILS NFR BLD AUTO: 57.8 % (ref 42.7–76)
NEUTROPHILS NFR BLD AUTO: 6.03 10*3/MM3 (ref 1.7–7)
NEUTROPHILS NFR BLD AUTO: 66.9 % (ref 42.7–76)
NITRITE UR QL STRIP: NEGATIVE
NITRITE UR QL STRIP: POSITIVE
NRBC BLD AUTO-RTO: 0 /100 WBC (ref 0–0.2)
NRBC BLD AUTO-RTO: 0.1 /100 WBC (ref 0–0.2)
NT-PROBNP SERPL-MCNC: 3715 PG/ML (ref 0–1800)
NT-PROBNP SERPL-MCNC: 80 PG/ML (ref 0–1800)
PCO2 BLDA: 37.8 MM HG (ref 35–45)
PH BLDA: 7.4 PH UNITS (ref 7.35–7.45)
PH UR STRIP.AUTO: 6 [PH] (ref 5–8)
PH UR STRIP.AUTO: 6.5 [PH] (ref 5–8)
PHENYTOIN SERPL-MCNC: 2.9 MCG/ML (ref 10–20)
PHENYTOIN SERPL-MCNC: <0.8 MCG/ML (ref 10–20)
PLATELET # BLD AUTO: 229 10*3/MM3 (ref 140–450)
PLATELET # BLD AUTO: 244 10*3/MM3 (ref 140–450)
PLATELET # BLD AUTO: 361 10*3/MM3 (ref 140–450)
PLATELET # BLD AUTO: 372 10*3/MM3 (ref 140–450)
PLATELET # BLD AUTO: 390 10*3/MM3 (ref 140–450)
PLATELET # BLD AUTO: 397 10*3/MM3 (ref 140–450)
PLATELET # BLD AUTO: 401 10*3/MM3 (ref 140–450)
PLATELET # BLD AUTO: 409 10*3/MM3 (ref 140–450)
PLATELET # BLD AUTO: 418 10*3/MM3 (ref 140–450)
PMV BLD AUTO: 9.2 FL (ref 6–12)
PMV BLD AUTO: 9.3 FL (ref 6–12)
PMV BLD AUTO: 9.4 FL (ref 6–12)
PMV BLD AUTO: 9.4 FL (ref 6–12)
PMV BLD AUTO: 9.5 FL (ref 6–12)
PMV BLD AUTO: 9.5 FL (ref 6–12)
PMV BLD AUTO: 9.6 FL (ref 6–12)
PMV BLD AUTO: 9.8 FL (ref 6–12)
PMV BLD AUTO: 9.9 FL (ref 6–12)
PO2 BLDA: 94 MM HG (ref 80–100)
POTASSIUM SERPL-SCNC: 2.9 MMOL/L (ref 3.5–5.2)
POTASSIUM SERPL-SCNC: 3.5 MMOL/L (ref 3.5–5.2)
POTASSIUM SERPL-SCNC: 3.6 MMOL/L (ref 3.5–5.2)
POTASSIUM SERPL-SCNC: 3.7 MMOL/L (ref 3.5–5.2)
POTASSIUM SERPL-SCNC: 3.7 MMOL/L (ref 3.5–5.2)
POTASSIUM SERPL-SCNC: 4.1 MMOL/L (ref 3.5–5.2)
POTASSIUM SERPL-SCNC: 4.2 MMOL/L (ref 3.5–5.2)
POTASSIUM SERPL-SCNC: 4.3 MMOL/L (ref 3.5–5.2)
POTASSIUM SERPL-SCNC: 4.5 MMOL/L (ref 3.5–5.2)
POTASSIUM SERPL-SCNC: 5 MMOL/L (ref 3.5–5.2)
POTASSIUM SERPL-SCNC: 5 MMOL/L (ref 3.5–5.2)
PROT SERPL-MCNC: 4.3 G/DL (ref 6–8.5)
PROT SERPL-MCNC: 5.6 G/DL (ref 6–8.5)
PROT UR QL STRIP: ABNORMAL
PROT UR QL STRIP: NEGATIVE
PROTHROMBIN TIME: 13.5 SECONDS (ref 11.7–14.2)
QT INTERVAL: 277 MS
QT INTERVAL: 296 MS
QT INTERVAL: 387 MS
QT INTERVAL: 394 MS
QT INTERVAL: 399 MS
QT INTERVAL: 454 MS
QT INTERVAL: 483 MS
RBC # BLD AUTO: 3.56 10*6/MM3 (ref 3.77–5.28)
RBC # BLD AUTO: 3.61 10*6/MM3 (ref 3.77–5.28)
RBC # BLD AUTO: 3.63 10*6/MM3 (ref 3.77–5.28)
RBC # BLD AUTO: 3.67 10*6/MM3 (ref 3.77–5.28)
RBC # BLD AUTO: 3.7 10*6/MM3 (ref 3.77–5.28)
RBC # BLD AUTO: 3.78 10*6/MM3 (ref 3.77–5.28)
RBC # BLD AUTO: 3.81 10*6/MM3 (ref 3.77–5.28)
RBC # BLD AUTO: 3.82 10*6/MM3 (ref 3.77–5.28)
RBC # BLD AUTO: 4.08 10*6/MM3 (ref 3.77–5.28)
RBC # UR STRIP: ABNORMAL /HPF
RBC # UR STRIP: ABNORMAL /HPF
REF LAB TEST METHOD: ABNORMAL
REF LAB TEST METHOD: ABNORMAL
SAO2 % BLDCOA: 97.4 % (ref 92–99)
SINUS: 2.9 CM
SODIUM SERPL-SCNC: 126 MMOL/L (ref 136–145)
SODIUM SERPL-SCNC: 135 MMOL/L (ref 136–145)
SODIUM SERPL-SCNC: 139 MMOL/L (ref 136–145)
SODIUM SERPL-SCNC: 139 MMOL/L (ref 136–145)
SODIUM SERPL-SCNC: 140 MMOL/L (ref 136–145)
SODIUM SERPL-SCNC: 140 MMOL/L (ref 136–145)
SODIUM SERPL-SCNC: 141 MMOL/L (ref 136–145)
SODIUM SERPL-SCNC: 141 MMOL/L (ref 136–145)
SODIUM SERPL-SCNC: 142 MMOL/L (ref 136–145)
SP GR UR STRIP: 1.01 (ref 1–1.03)
SP GR UR STRIP: 1.02 (ref 1–1.03)
SQUAMOUS #/AREA URNS HPF: ABNORMAL /HPF
SQUAMOUS #/AREA URNS HPF: ABNORMAL /HPF
STRESS TARGET HR: 117 BPM
T4 FREE SERPL-MCNC: 1.28 NG/DL (ref 0.93–1.7)
T4 FREE SERPL-MCNC: 1.49 NG/DL (ref 0.93–1.7)
T4 FREE SERPL-MCNC: 1.59 NG/DL (ref 0.93–1.7)
TOTAL RATE: 28 BREATHS/MINUTE
TRIGL SERPL-MCNC: 146 MG/DL (ref 0–150)
TROPONIN T DELTA: 13 NG/L
TROPONIN T SERPL HS-MCNC: 16 NG/L
TROPONIN T SERPL HS-MCNC: 178 NG/L
TROPONIN T SERPL HS-MCNC: 38 NG/L
TSH SERPL DL<=0.05 MIU/L-ACNC: 0.7 UIU/ML (ref 0.27–4.2)
TSH SERPL DL<=0.05 MIU/L-ACNC: 2.15 UIU/ML (ref 0.27–4.2)
UROBILINOGEN UR QL STRIP: ABNORMAL
UROBILINOGEN UR QL STRIP: ABNORMAL
VLDLC SERPL-MCNC: 26 MG/DL (ref 5–40)
WBC # UR STRIP: ABNORMAL /HPF
WBC # UR STRIP: ABNORMAL /HPF
WBC NRBC COR # BLD: 10.42 10*3/MM3 (ref 3.4–10.8)
WBC NRBC COR # BLD: 11.04 10*3/MM3 (ref 3.4–10.8)
WBC NRBC COR # BLD: 5.13 10*3/MM3 (ref 3.4–10.8)
WBC NRBC COR # BLD: 6.89 10*3/MM3 (ref 3.4–10.8)
WBC NRBC COR # BLD: 7.25 10*3/MM3 (ref 3.4–10.8)
WBC NRBC COR # BLD: 7.82 10*3/MM3 (ref 3.4–10.8)
WBC NRBC COR # BLD: 8.03 10*3/MM3 (ref 3.4–10.8)
WBC NRBC COR # BLD: 9.62 10*3/MM3 (ref 3.4–10.8)
WBC NRBC COR # BLD: 9.65 10*3/MM3 (ref 3.4–10.8)

## 2023-01-01 PROCEDURE — 85025 COMPLETE CBC W/AUTO DIFF WBC: CPT | Performed by: HOSPITALIST

## 2023-01-01 PROCEDURE — 82803 BLOOD GASES ANY COMBINATION: CPT

## 2023-01-01 PROCEDURE — 36600 WITHDRAWAL OF ARTERIAL BLOOD: CPT

## 2023-01-01 PROCEDURE — 93010 ELECTROCARDIOGRAM REPORT: CPT | Performed by: STUDENT IN AN ORGANIZED HEALTH CARE EDUCATION/TRAINING PROGRAM

## 2023-01-01 PROCEDURE — 0 POTASSIUM CHLORIDE 10 MEQ/100ML SOLUTION: Performed by: EMERGENCY MEDICINE

## 2023-01-01 PROCEDURE — 82962 GLUCOSE BLOOD TEST: CPT

## 2023-01-01 PROCEDURE — 84132 ASSAY OF SERUM POTASSIUM: CPT | Performed by: HOSPITALIST

## 2023-01-01 PROCEDURE — 80061 LIPID PANEL: CPT | Performed by: HOSPITALIST

## 2023-01-01 PROCEDURE — 99285 EMERGENCY DEPT VISIT HI MDM: CPT

## 2023-01-01 PROCEDURE — 92523 SPEECH SOUND LANG COMPREHEN: CPT

## 2023-01-01 PROCEDURE — 84439 ASSAY OF FREE THYROXINE: CPT | Performed by: EMERGENCY MEDICINE

## 2023-01-01 PROCEDURE — 84443 ASSAY THYROID STIM HORMONE: CPT | Performed by: EMERGENCY MEDICINE

## 2023-01-01 PROCEDURE — 83735 ASSAY OF MAGNESIUM: CPT | Performed by: EMERGENCY MEDICINE

## 2023-01-01 PROCEDURE — 83036 HEMOGLOBIN GLYCOSYLATED A1C: CPT | Performed by: HOSPITALIST

## 2023-01-01 PROCEDURE — 80048 BASIC METABOLIC PNL TOTAL CA: CPT | Performed by: HOSPITALIST

## 2023-01-01 PROCEDURE — G0378 HOSPITAL OBSERVATION PER HR: HCPCS

## 2023-01-01 PROCEDURE — 93005 ELECTROCARDIOGRAM TRACING: CPT | Performed by: EMERGENCY MEDICINE

## 2023-01-01 PROCEDURE — 80185 ASSAY OF PHENYTOIN TOTAL: CPT | Performed by: PSYCHIATRY & NEUROLOGY

## 2023-01-01 PROCEDURE — 82330 ASSAY OF CALCIUM: CPT | Performed by: EMERGENCY MEDICINE

## 2023-01-01 PROCEDURE — 25010000002 MORPHINE PER 10 MG: Performed by: EMERGENCY MEDICINE

## 2023-01-01 PROCEDURE — 70498 CT ANGIOGRAPHY NECK: CPT

## 2023-01-01 PROCEDURE — 82550 ASSAY OF CK (CPK): CPT | Performed by: EMERGENCY MEDICINE

## 2023-01-01 PROCEDURE — 93005 ELECTROCARDIOGRAM TRACING: CPT | Performed by: HOSPITALIST

## 2023-01-01 PROCEDURE — 93306 TTE W/DOPPLER COMPLETE: CPT

## 2023-01-01 PROCEDURE — 25010000002 ENOXAPARIN PER 10 MG: Performed by: NURSE PRACTITIONER

## 2023-01-01 PROCEDURE — 25010000002 LORAZEPAM PER 2 MG: Performed by: HOSPITALIST

## 2023-01-01 PROCEDURE — 93306 TTE W/DOPPLER COMPLETE: CPT | Performed by: INTERNAL MEDICINE

## 2023-01-01 PROCEDURE — 25010000002 MORPHINE PER 10 MG: Performed by: HOSPITALIST

## 2023-01-01 PROCEDURE — 85730 THROMBOPLASTIN TIME PARTIAL: CPT | Performed by: EMERGENCY MEDICINE

## 2023-01-01 PROCEDURE — 25010000002 CALCIUM GLUCONATE-NACL 1-0.675 GM/50ML-% SOLUTION: Performed by: EMERGENCY MEDICINE

## 2023-01-01 PROCEDURE — 93005 ELECTROCARDIOGRAM TRACING: CPT | Performed by: NURSE PRACTITIONER

## 2023-01-01 PROCEDURE — 25010000002 ONDANSETRON PER 1 MG: Performed by: EMERGENCY MEDICINE

## 2023-01-01 PROCEDURE — 80053 COMPREHEN METABOLIC PANEL: CPT | Performed by: EMERGENCY MEDICINE

## 2023-01-01 PROCEDURE — 99232 SBSQ HOSP IP/OBS MODERATE 35: CPT | Performed by: INTERNAL MEDICINE

## 2023-01-01 PROCEDURE — 81001 URINALYSIS AUTO W/SCOPE: CPT | Performed by: EMERGENCY MEDICINE

## 2023-01-01 PROCEDURE — 25010000002 CEFTRIAXONE PER 250 MG: Performed by: HOSPITALIST

## 2023-01-01 PROCEDURE — 87186 SC STD MICRODIL/AGAR DIL: CPT | Performed by: EMERGENCY MEDICINE

## 2023-01-01 PROCEDURE — 93010 ELECTROCARDIOGRAM REPORT: CPT | Performed by: INTERNAL MEDICINE

## 2023-01-01 PROCEDURE — 83735 ASSAY OF MAGNESIUM: CPT | Performed by: HOSPITALIST

## 2023-01-01 PROCEDURE — 92610 EVALUATE SWALLOWING FUNCTION: CPT

## 2023-01-01 PROCEDURE — P9612 CATHETERIZE FOR URINE SPEC: HCPCS

## 2023-01-01 PROCEDURE — 97530 THERAPEUTIC ACTIVITIES: CPT | Performed by: PHYSICAL THERAPIST

## 2023-01-01 PROCEDURE — 97110 THERAPEUTIC EXERCISES: CPT

## 2023-01-01 PROCEDURE — 97530 THERAPEUTIC ACTIVITIES: CPT

## 2023-01-01 PROCEDURE — 85025 COMPLETE CBC W/AUTO DIFF WBC: CPT | Performed by: EMERGENCY MEDICINE

## 2023-01-01 PROCEDURE — 70450 CT HEAD/BRAIN W/O DYE: CPT

## 2023-01-01 PROCEDURE — 36415 COLL VENOUS BLD VENIPUNCTURE: CPT

## 2023-01-01 PROCEDURE — 25010000002 CEFTRIAXONE PER 250 MG: Performed by: EMERGENCY MEDICINE

## 2023-01-01 PROCEDURE — 0042T HC CT CEREBRAL PERFUSION W/WO CONTRAST: CPT

## 2023-01-01 PROCEDURE — 84484 ASSAY OF TROPONIN QUANT: CPT | Performed by: EMERGENCY MEDICINE

## 2023-01-01 PROCEDURE — 99232 SBSQ HOSP IP/OBS MODERATE 35: CPT

## 2023-01-01 PROCEDURE — 84439 ASSAY OF FREE THYROXINE: CPT | Performed by: HOSPITALIST

## 2023-01-01 PROCEDURE — 84484 ASSAY OF TROPONIN QUANT: CPT | Performed by: HOSPITALIST

## 2023-01-01 PROCEDURE — 97162 PT EVAL MOD COMPLEX 30 MIN: CPT

## 2023-01-01 PROCEDURE — 97535 SELF CARE MNGMENT TRAINING: CPT

## 2023-01-01 PROCEDURE — 97535 SELF CARE MNGMENT TRAINING: CPT | Performed by: OCCUPATIONAL THERAPIST

## 2023-01-01 PROCEDURE — 99233 SBSQ HOSP IP/OBS HIGH 50: CPT | Performed by: INTERNAL MEDICINE

## 2023-01-01 PROCEDURE — 80185 ASSAY OF PHENYTOIN TOTAL: CPT | Performed by: EMERGENCY MEDICINE

## 2023-01-01 PROCEDURE — 80162 ASSAY OF DIGOXIN TOTAL: CPT | Performed by: EMERGENCY MEDICINE

## 2023-01-01 PROCEDURE — 83880 ASSAY OF NATRIURETIC PEPTIDE: CPT | Performed by: EMERGENCY MEDICINE

## 2023-01-01 PROCEDURE — 99221 1ST HOSP IP/OBS SF/LOW 40: CPT | Performed by: INTERNAL MEDICINE

## 2023-01-01 PROCEDURE — 71045 X-RAY EXAM CHEST 1 VIEW: CPT

## 2023-01-01 PROCEDURE — 84443 ASSAY THYROID STIM HORMONE: CPT | Performed by: HOSPITALIST

## 2023-01-01 PROCEDURE — 0 IOPAMIDOL PER 1 ML: Performed by: HOSPITALIST

## 2023-01-01 PROCEDURE — 93005 ELECTROCARDIOGRAM TRACING: CPT | Performed by: INTERNAL MEDICINE

## 2023-01-01 PROCEDURE — 25010000002 HYDROMORPHONE 1 MG/ML SOLUTION: Performed by: HOSPITALIST

## 2023-01-01 PROCEDURE — 25010000002 MAGNESIUM SULFATE IN D5W 1G/100ML (PREMIX) 1-5 GM/100ML-% SOLUTION: Performed by: EMERGENCY MEDICINE

## 2023-01-01 PROCEDURE — 25010000002 HYDROMORPHONE PER 4 MG: Performed by: HOSPITALIST

## 2023-01-01 PROCEDURE — 97166 OT EVAL MOD COMPLEX 45 MIN: CPT

## 2023-01-01 PROCEDURE — 85610 PROTHROMBIN TIME: CPT | Performed by: EMERGENCY MEDICINE

## 2023-01-01 PROCEDURE — 99222 1ST HOSP IP/OBS MODERATE 55: CPT | Performed by: INTERNAL MEDICINE

## 2023-01-01 PROCEDURE — 99231 SBSQ HOSP IP/OBS SF/LOW 25: CPT | Performed by: PHYSICIAN ASSISTANT

## 2023-01-01 PROCEDURE — 71250 CT THORAX DX C-: CPT

## 2023-01-01 PROCEDURE — 87086 URINE CULTURE/COLONY COUNT: CPT | Performed by: EMERGENCY MEDICINE

## 2023-01-01 PROCEDURE — 99232 SBSQ HOSP IP/OBS MODERATE 35: CPT | Performed by: NURSE PRACTITIONER

## 2023-01-01 PROCEDURE — 70496 CT ANGIOGRAPHY HEAD: CPT

## 2023-01-01 PROCEDURE — 70551 MRI BRAIN STEM W/O DYE: CPT

## 2023-01-01 PROCEDURE — 99283 EMERGENCY DEPT VISIT LOW MDM: CPT

## 2023-01-01 PROCEDURE — 25010000002 DIGOXIN PER 500 MCG: Performed by: INTERNAL MEDICINE

## 2023-01-01 PROCEDURE — 99223 1ST HOSP IP/OBS HIGH 75: CPT | Performed by: PSYCHIATRY & NEUROLOGY

## 2023-01-01 PROCEDURE — 80048 BASIC METABOLIC PNL TOTAL CA: CPT | Performed by: INTERNAL MEDICINE

## 2023-01-01 PROCEDURE — 87077 CULTURE AEROBIC IDENTIFY: CPT | Performed by: EMERGENCY MEDICINE

## 2023-01-01 PROCEDURE — 85027 COMPLETE CBC AUTOMATED: CPT | Performed by: INTERNAL MEDICINE

## 2023-01-01 RX ORDER — SODIUM CHLORIDE 9 MG/ML
40 INJECTION, SOLUTION INTRAVENOUS AS NEEDED
Status: DISCONTINUED | OUTPATIENT
Start: 2023-01-01 | End: 2023-01-01 | Stop reason: HOSPADM

## 2023-01-01 RX ORDER — DEXTROSE, SODIUM CHLORIDE, AND POTASSIUM CHLORIDE 5; .9; .15 G/100ML; G/100ML; G/100ML
75 INJECTION INTRAVENOUS CONTINUOUS
Status: DISCONTINUED | OUTPATIENT
Start: 2023-01-01 | End: 2023-01-01

## 2023-01-01 RX ORDER — MORPHINE SULFATE 10 MG/ML
6 INJECTION INTRAMUSCULAR; INTRAVENOUS; SUBCUTANEOUS
Status: DISCONTINUED | OUTPATIENT
Start: 2023-01-01 | End: 2023-01-01 | Stop reason: HOSPADM

## 2023-01-01 RX ORDER — ONDANSETRON 2 MG/ML
4 INJECTION INTRAMUSCULAR; INTRAVENOUS EVERY 6 HOURS PRN
Status: DISCONTINUED | OUTPATIENT
Start: 2023-01-01 | End: 2023-01-01 | Stop reason: HOSPADM

## 2023-01-01 RX ORDER — POTASSIUM CHLORIDE 7.45 MG/ML
10 INJECTION INTRAVENOUS ONCE
Status: COMPLETED | OUTPATIENT
Start: 2023-01-01 | End: 2023-01-01

## 2023-01-01 RX ORDER — ACETAMINOPHEN 325 MG/1
650 TABLET ORAL EVERY 4 HOURS PRN
Status: DISCONTINUED | OUTPATIENT
Start: 2023-01-01 | End: 2023-01-01 | Stop reason: SDUPTHER

## 2023-01-01 RX ORDER — CALCIUM GLUCONATE 20 MG/ML
1 INJECTION, SOLUTION INTRAVENOUS ONCE
Status: COMPLETED | OUTPATIENT
Start: 2023-01-01 | End: 2023-01-01

## 2023-01-01 RX ORDER — ATORVASTATIN CALCIUM 20 MG/1
40 TABLET, FILM COATED ORAL NIGHTLY
Status: DISCONTINUED | OUTPATIENT
Start: 2023-01-01 | End: 2023-01-01

## 2023-01-01 RX ORDER — PANTOPRAZOLE SODIUM 40 MG/1
40 TABLET, DELAYED RELEASE ORAL
Status: DISCONTINUED | OUTPATIENT
Start: 2023-01-01 | End: 2023-01-01

## 2023-01-01 RX ORDER — ATORVASTATIN CALCIUM 40 MG/1
40 TABLET, FILM COATED ORAL NIGHTLY
Qty: 90 TABLET
Start: 2023-01-01

## 2023-01-01 RX ORDER — LACOSAMIDE 100 MG/1
100 TABLET ORAL 2 TIMES DAILY
Status: DISCONTINUED | OUTPATIENT
Start: 2023-01-01 | End: 2023-01-01

## 2023-01-01 RX ORDER — LACOSAMIDE 50 MG/1
TABLET ORAL
Qty: 126 TABLET | Refills: 0 | Status: SHIPPED | OUTPATIENT
Start: 2023-01-01 | End: 2023-01-01

## 2023-01-01 RX ORDER — LORAZEPAM 2 MG/ML
0.5 INJECTION INTRAMUSCULAR
Status: DISCONTINUED | OUTPATIENT
Start: 2023-01-01 | End: 2023-01-01 | Stop reason: HOSPADM

## 2023-01-01 RX ORDER — LORAZEPAM 2 MG/ML
1 INJECTION INTRAMUSCULAR
Status: DISCONTINUED | OUTPATIENT
Start: 2023-01-01 | End: 2023-01-01 | Stop reason: HOSPADM

## 2023-01-01 RX ORDER — HYDROCODONE BITARTRATE AND ACETAMINOPHEN 5; 325 MG/1; MG/1
1 TABLET ORAL EVERY 4 HOURS PRN
Status: DISCONTINUED | OUTPATIENT
Start: 2023-01-01 | End: 2023-01-01

## 2023-01-01 RX ORDER — POTASSIUM CHLORIDE 1.5 G/1.77G
40 POWDER, FOR SOLUTION ORAL AS NEEDED
Status: DISCONTINUED | OUTPATIENT
Start: 2023-01-01 | End: 2023-01-01

## 2023-01-01 RX ORDER — ACETAMINOPHEN 325 MG/1
650 TABLET ORAL EVERY 4 HOURS PRN
Status: DISCONTINUED | OUTPATIENT
Start: 2023-01-01 | End: 2023-01-01 | Stop reason: HOSPADM

## 2023-01-01 RX ORDER — CALCIUM GLUCONATE 20 MG/ML
1 INJECTION, SOLUTION INTRAVENOUS ONCE
Status: DISCONTINUED | OUTPATIENT
Start: 2023-01-01 | End: 2023-01-01

## 2023-01-01 RX ORDER — GLYCOPYRROLATE 0.2 MG/ML
0.2 INJECTION INTRAMUSCULAR; INTRAVENOUS
Status: DISCONTINUED | OUTPATIENT
Start: 2023-01-01 | End: 2023-01-01 | Stop reason: HOSPADM

## 2023-01-01 RX ORDER — DILTIAZEM HCL IN NACL,ISO-OSM 125 MG/125
5-15 PLASTIC BAG, INJECTION (ML) INTRAVENOUS
Status: DISCONTINUED | OUTPATIENT
Start: 2023-01-01 | End: 2023-01-01

## 2023-01-01 RX ORDER — ACETAMINOPHEN 160 MG/5ML
650 SOLUTION ORAL EVERY 4 HOURS PRN
Status: DISCONTINUED | OUTPATIENT
Start: 2023-01-01 | End: 2023-01-01 | Stop reason: HOSPADM

## 2023-01-01 RX ORDER — MORPHINE SULFATE 2 MG/ML
2 INJECTION, SOLUTION INTRAMUSCULAR; INTRAVENOUS EVERY 4 HOURS PRN
Status: DISCONTINUED | OUTPATIENT
Start: 2023-01-01 | End: 2023-01-01

## 2023-01-01 RX ORDER — DILTIAZEM HYDROCHLORIDE 5 MG/ML
10 INJECTION INTRAVENOUS ONCE
Status: COMPLETED | OUTPATIENT
Start: 2023-01-01 | End: 2023-01-01

## 2023-01-01 RX ORDER — HALOPERIDOL 2 MG/ML
1 SOLUTION ORAL EVERY 4 HOURS PRN
Status: DISCONTINUED | OUTPATIENT
Start: 2023-01-01 | End: 2023-01-01 | Stop reason: HOSPADM

## 2023-01-01 RX ORDER — LACOSAMIDE 50 MG/1
50 TABLET ORAL EVERY 12 HOURS SCHEDULED
Status: DISCONTINUED | OUTPATIENT
Start: 2023-01-01 | End: 2023-01-01 | Stop reason: HOSPADM

## 2023-01-01 RX ORDER — CALCIUM CARBONATE 200(500)MG
2 TABLET,CHEWABLE ORAL 2 TIMES DAILY PRN
Status: DISCONTINUED | OUTPATIENT
Start: 2023-01-01 | End: 2023-01-01 | Stop reason: HOSPADM

## 2023-01-01 RX ORDER — HYDROMORPHONE HYDROCHLORIDE 1 MG/ML
0.5 INJECTION, SOLUTION INTRAMUSCULAR; INTRAVENOUS; SUBCUTANEOUS
Status: DISCONTINUED | OUTPATIENT
Start: 2023-01-01 | End: 2023-01-01 | Stop reason: HOSPADM

## 2023-01-01 RX ORDER — MORPHINE SULFATE 2 MG/ML
INJECTION, SOLUTION INTRAMUSCULAR; INTRAVENOUS
Status: ACTIVE
Start: 2023-01-01 | End: 2023-01-01

## 2023-01-01 RX ORDER — ALBUTEROL SULFATE 2.5 MG/3ML
2.5 SOLUTION RESPIRATORY (INHALATION) EVERY 4 HOURS PRN
Status: DISCONTINUED | OUTPATIENT
Start: 2023-01-01 | End: 2023-01-01

## 2023-01-01 RX ORDER — ONDANSETRON 4 MG/1
4 TABLET, FILM COATED ORAL EVERY 6 HOURS PRN
Status: DISCONTINUED | OUTPATIENT
Start: 2023-01-01 | End: 2023-01-01 | Stop reason: HOSPADM

## 2023-01-01 RX ORDER — HYDROMORPHONE HCL 110MG/55ML
1.5 PATIENT CONTROLLED ANALGESIA SYRINGE INTRAVENOUS
Status: DISCONTINUED | OUTPATIENT
Start: 2023-01-01 | End: 2023-01-01 | Stop reason: HOSPADM

## 2023-01-01 RX ORDER — PROMETHAZINE HYDROCHLORIDE 12.5 MG/1
6.25 SUPPOSITORY RECTAL EVERY 4 HOURS PRN
Status: DISCONTINUED | OUTPATIENT
Start: 2023-01-01 | End: 2023-01-01 | Stop reason: HOSPADM

## 2023-01-01 RX ORDER — POTASSIUM CHLORIDE 7.45 MG/ML
10 INJECTION INTRAVENOUS
Status: DISCONTINUED | OUTPATIENT
Start: 2023-01-01 | End: 2023-01-01

## 2023-01-01 RX ORDER — PROMETHAZINE HYDROCHLORIDE 25 MG/1
6.25 TABLET ORAL EVERY 4 HOURS PRN
Status: DISCONTINUED | OUTPATIENT
Start: 2023-01-01 | End: 2023-01-01 | Stop reason: HOSPADM

## 2023-01-01 RX ORDER — POTASSIUM CHLORIDE 750 MG/1
40 TABLET, FILM COATED, EXTENDED RELEASE ORAL AS NEEDED
Status: DISCONTINUED | OUTPATIENT
Start: 2023-01-01 | End: 2023-01-01

## 2023-01-01 RX ORDER — ATORVASTATIN CALCIUM 20 MG/1
40 TABLET, FILM COATED ORAL NIGHTLY
Status: DISCONTINUED | OUTPATIENT
Start: 2023-01-01 | End: 2023-01-01 | Stop reason: HOSPADM

## 2023-01-01 RX ORDER — HALOPERIDOL 1 MG/1
1 TABLET ORAL EVERY 4 HOURS PRN
Status: DISCONTINUED | OUTPATIENT
Start: 2023-01-01 | End: 2023-01-01 | Stop reason: HOSPADM

## 2023-01-01 RX ORDER — ACETAMINOPHEN 650 MG/1
650 SUPPOSITORY RECTAL EVERY 4 HOURS PRN
Status: DISCONTINUED | OUTPATIENT
Start: 2023-01-01 | End: 2023-01-01 | Stop reason: HOSPADM

## 2023-01-01 RX ORDER — ASPIRIN 300 MG/1
300 SUPPOSITORY RECTAL DAILY
Status: DISCONTINUED | OUTPATIENT
Start: 2023-01-01 | End: 2023-01-01

## 2023-01-01 RX ORDER — DIPHENHYDRAMINE HYDROCHLORIDE AND LIDOCAINE HYDROCHLORIDE AND ALUMINUM HYDROXIDE AND MAGNESIUM HYDRO
5 KIT EVERY 4 HOURS PRN
Status: DISCONTINUED | OUTPATIENT
Start: 2023-01-01 | End: 2023-01-01 | Stop reason: HOSPADM

## 2023-01-01 RX ORDER — POTASSIUM CHLORIDE 7.45 MG/ML
10 INJECTION INTRAVENOUS ONCE
Status: DISCONTINUED | OUTPATIENT
Start: 2023-01-01 | End: 2023-01-01

## 2023-01-01 RX ORDER — PHENYTOIN 50 MG/1
50 TABLET, CHEWABLE ORAL 3 TIMES DAILY
Status: DISCONTINUED | OUTPATIENT
Start: 2023-01-01 | End: 2023-01-01

## 2023-01-01 RX ORDER — GLYCOPYRROLATE 0.2 MG/ML
0.4 INJECTION INTRAMUSCULAR; INTRAVENOUS
Status: DISCONTINUED | OUTPATIENT
Start: 2023-01-01 | End: 2023-01-01 | Stop reason: HOSPADM

## 2023-01-01 RX ORDER — ALBUTEROL SULFATE 90 UG/1
2 AEROSOL, METERED RESPIRATORY (INHALATION) EVERY 4 HOURS PRN
Status: DISCONTINUED | OUTPATIENT
Start: 2023-01-01 | End: 2023-01-01 | Stop reason: CLARIF

## 2023-01-01 RX ORDER — DIGOXIN 0.25 MG/ML
250 INJECTION INTRAMUSCULAR; INTRAVENOUS ONCE
Status: COMPLETED | OUTPATIENT
Start: 2023-01-01 | End: 2023-01-01

## 2023-01-01 RX ORDER — MORPHINE SULFATE 20 MG/ML
5 SOLUTION ORAL
Status: DISCONTINUED | OUTPATIENT
Start: 2023-01-01 | End: 2023-01-01 | Stop reason: HOSPADM

## 2023-01-01 RX ORDER — SODIUM CHLORIDE 9 MG/ML
75 INJECTION, SOLUTION INTRAVENOUS CONTINUOUS
Status: DISCONTINUED | OUTPATIENT
Start: 2023-01-01 | End: 2023-01-01

## 2023-01-01 RX ORDER — SODIUM CHLORIDE 0.9 % (FLUSH) 0.9 %
10 SYRINGE (ML) INJECTION EVERY 12 HOURS SCHEDULED
Status: DISCONTINUED | OUTPATIENT
Start: 2023-01-01 | End: 2023-01-01 | Stop reason: SDUPTHER

## 2023-01-01 RX ORDER — CETIRIZINE HYDROCHLORIDE 10 MG/1
10 TABLET ORAL EVERY EVENING
COMMUNITY

## 2023-01-01 RX ORDER — ATORVASTATIN CALCIUM 80 MG/1
80 TABLET, FILM COATED ORAL NIGHTLY
Status: DISCONTINUED | OUTPATIENT
Start: 2023-01-01 | End: 2023-01-01

## 2023-01-01 RX ORDER — HALOPERIDOL 5 MG/ML
1 INJECTION INTRAMUSCULAR EVERY 4 HOURS PRN
Status: DISCONTINUED | OUTPATIENT
Start: 2023-01-01 | End: 2023-01-01 | Stop reason: HOSPADM

## 2023-01-01 RX ORDER — LORAZEPAM 2 MG/ML
1 CONCENTRATE ORAL
Status: DISCONTINUED | OUTPATIENT
Start: 2023-01-01 | End: 2023-01-01 | Stop reason: HOSPADM

## 2023-01-01 RX ORDER — ACETAMINOPHEN 500 MG
500 TABLET ORAL ONCE
Status: COMPLETED | OUTPATIENT
Start: 2023-01-01 | End: 2023-01-01

## 2023-01-01 RX ORDER — ENOXAPARIN SODIUM 100 MG/ML
1 INJECTION SUBCUTANEOUS ONCE
Status: COMPLETED | OUTPATIENT
Start: 2023-01-01 | End: 2023-01-01

## 2023-01-01 RX ORDER — CETIRIZINE HYDROCHLORIDE 10 MG/1
10 TABLET ORAL EVERY EVENING
Status: DISCONTINUED | OUTPATIENT
Start: 2023-01-01 | End: 2023-01-01

## 2023-01-01 RX ORDER — MORPHINE SULFATE 20 MG/ML
10 SOLUTION ORAL
Status: DISCONTINUED | OUTPATIENT
Start: 2023-01-01 | End: 2023-01-01 | Stop reason: HOSPADM

## 2023-01-01 RX ORDER — SODIUM CHLORIDE 0.9 % (FLUSH) 0.9 %
10 SYRINGE (ML) INJECTION AS NEEDED
Status: DISCONTINUED | OUTPATIENT
Start: 2023-01-01 | End: 2023-01-01 | Stop reason: HOSPADM

## 2023-01-01 RX ORDER — DIPHENHYDRAMINE HCL 25 MG
25 CAPSULE ORAL EVERY 6 HOURS PRN
Status: DISCONTINUED | OUTPATIENT
Start: 2023-01-01 | End: 2023-01-01 | Stop reason: HOSPADM

## 2023-01-01 RX ORDER — ACETAMINOPHEN 650 MG/1
650 SUPPOSITORY RECTAL EVERY 4 HOURS PRN
Status: DISCONTINUED | OUTPATIENT
Start: 2023-01-01 | End: 2023-01-01 | Stop reason: SDUPTHER

## 2023-01-01 RX ORDER — METOPROLOL TARTRATE 37.5 MG/1
37.5 TABLET, FILM COATED ORAL EVERY 12 HOURS SCHEDULED
Start: 2023-01-01

## 2023-01-01 RX ORDER — LORAZEPAM 2 MG/ML
0.5 CONCENTRATE ORAL
Status: DISCONTINUED | OUTPATIENT
Start: 2023-01-01 | End: 2023-01-01 | Stop reason: HOSPADM

## 2023-01-01 RX ORDER — ACETAMINOPHEN 160 MG/5ML
650 SOLUTION ORAL EVERY 4 HOURS PRN
Status: DISCONTINUED | OUTPATIENT
Start: 2023-01-01 | End: 2023-01-01 | Stop reason: SDUPTHER

## 2023-01-01 RX ORDER — SODIUM CHLORIDE 9 MG/ML
100 INJECTION, SOLUTION INTRAVENOUS CONTINUOUS
Status: DISCONTINUED | OUTPATIENT
Start: 2023-01-01 | End: 2023-01-01

## 2023-01-01 RX ORDER — SODIUM CHLORIDE 0.9 % (FLUSH) 0.9 %
10 SYRINGE (ML) INJECTION EVERY 12 HOURS SCHEDULED
Status: DISCONTINUED | OUTPATIENT
Start: 2023-01-01 | End: 2023-01-01 | Stop reason: HOSPADM

## 2023-01-01 RX ORDER — ATORVASTATIN CALCIUM 20 MG/1
20 TABLET, FILM COATED ORAL DAILY
Status: DISCONTINUED | OUTPATIENT
Start: 2023-01-01 | End: 2023-01-01 | Stop reason: SDUPTHER

## 2023-01-01 RX ORDER — KETOROLAC TROMETHAMINE 15 MG/ML
15 INJECTION, SOLUTION INTRAMUSCULAR; INTRAVENOUS EVERY 6 HOURS PRN
Status: DISCONTINUED | OUTPATIENT
Start: 2023-01-01 | End: 2023-01-01 | Stop reason: HOSPADM

## 2023-01-01 RX ORDER — LACOSAMIDE 100 MG/1
100 TABLET ORAL EVERY 12 HOURS SCHEDULED
Status: DISCONTINUED | OUTPATIENT
Start: 2023-01-01 | End: 2023-01-01 | Stop reason: HOSPADM

## 2023-01-01 RX ORDER — MORPHINE SULFATE 2 MG/ML
2 INJECTION, SOLUTION INTRAMUSCULAR; INTRAVENOUS
Status: DISCONTINUED | OUTPATIENT
Start: 2023-01-01 | End: 2023-01-01 | Stop reason: HOSPADM

## 2023-01-01 RX ORDER — ONDANSETRON 2 MG/ML
4 INJECTION INTRAMUSCULAR; INTRAVENOUS ONCE
Status: COMPLETED | OUTPATIENT
Start: 2023-01-01 | End: 2023-01-01

## 2023-01-01 RX ORDER — LORAZEPAM 2 MG/ML
2 INJECTION INTRAMUSCULAR
Status: DISCONTINUED | OUTPATIENT
Start: 2023-01-01 | End: 2023-01-01 | Stop reason: HOSPADM

## 2023-01-01 RX ORDER — PANTOPRAZOLE SODIUM 40 MG/1
40 TABLET, DELAYED RELEASE ORAL
Status: DISCONTINUED | OUTPATIENT
Start: 2023-01-01 | End: 2023-01-01 | Stop reason: HOSPADM

## 2023-01-01 RX ORDER — MAGNESIUM SULFATE 1 G/100ML
1 INJECTION INTRAVENOUS ONCE
Status: COMPLETED | OUTPATIENT
Start: 2023-01-01 | End: 2023-01-01

## 2023-01-01 RX ORDER — DIPHENHYDRAMINE HYDROCHLORIDE 50 MG/ML
25 INJECTION INTRAMUSCULAR; INTRAVENOUS EVERY 6 HOURS PRN
Status: DISCONTINUED | OUTPATIENT
Start: 2023-01-01 | End: 2023-01-01 | Stop reason: HOSPADM

## 2023-01-01 RX ORDER — LACOSAMIDE 100 MG/1
100 TABLET ORAL 2 TIMES DAILY
COMMUNITY

## 2023-01-01 RX ORDER — SCOLOPAMINE TRANSDERMAL SYSTEM 1 MG/1
1 PATCH, EXTENDED RELEASE TRANSDERMAL
Status: DISCONTINUED | OUTPATIENT
Start: 2023-01-01 | End: 2023-01-01 | Stop reason: HOSPADM

## 2023-01-01 RX ORDER — NITROGLYCERIN 0.4 MG/1
0.4 TABLET SUBLINGUAL
Status: DISCONTINUED | OUTPATIENT
Start: 2023-01-01 | End: 2023-01-01 | Stop reason: HOSPADM

## 2023-01-01 RX ORDER — ASPIRIN 325 MG
325 TABLET ORAL DAILY
Status: DISCONTINUED | OUTPATIENT
Start: 2023-01-01 | End: 2023-01-01

## 2023-01-01 RX ORDER — LORAZEPAM 2 MG/ML
2 CONCENTRATE ORAL
Status: DISCONTINUED | OUTPATIENT
Start: 2023-01-01 | End: 2023-01-01 | Stop reason: HOSPADM

## 2023-01-01 RX ORDER — ONDANSETRON 2 MG/ML
4 INJECTION INTRAMUSCULAR; INTRAVENOUS EVERY 6 HOURS PRN
Status: DISCONTINUED | OUTPATIENT
Start: 2023-01-01 | End: 2023-01-01

## 2023-01-01 RX ORDER — ENOXAPARIN SODIUM 100 MG/ML
1 INJECTION SUBCUTANEOUS EVERY 12 HOURS
Status: DISCONTINUED | OUTPATIENT
Start: 2023-01-01 | End: 2023-01-01

## 2023-01-01 RX ORDER — MORPHINE SULFATE 4 MG/ML
4 INJECTION, SOLUTION INTRAMUSCULAR; INTRAVENOUS
Status: DISCONTINUED | OUTPATIENT
Start: 2023-01-01 | End: 2023-01-01 | Stop reason: HOSPADM

## 2023-01-01 RX ORDER — DIPHENOXYLATE HYDROCHLORIDE AND ATROPINE SULFATE 2.5; .025 MG/1; MG/1
1 TABLET ORAL
Status: DISCONTINUED | OUTPATIENT
Start: 2023-01-01 | End: 2023-01-01 | Stop reason: HOSPADM

## 2023-01-01 RX ORDER — MORPHINE SULFATE 2 MG/ML
4 INJECTION, SOLUTION INTRAMUSCULAR; INTRAVENOUS ONCE
Status: DISCONTINUED | OUTPATIENT
Start: 2023-01-01 | End: 2023-01-01

## 2023-01-01 RX ORDER — LACOSAMIDE 50 MG/1
50 TABLET ORAL EVERY 12 HOURS SCHEDULED
Status: DISCONTINUED | OUTPATIENT
Start: 2023-01-01 | End: 2023-01-01

## 2023-01-01 RX ORDER — MAGNESIUM SULFATE 1 G/100ML
1 INJECTION INTRAVENOUS ONCE
Status: DISCONTINUED | OUTPATIENT
Start: 2023-01-01 | End: 2023-01-01

## 2023-01-01 RX ORDER — MORPHINE SULFATE 2 MG/ML
4 INJECTION, SOLUTION INTRAMUSCULAR; INTRAVENOUS ONCE
Status: COMPLETED | OUTPATIENT
Start: 2023-01-01 | End: 2023-01-01

## 2023-01-01 RX ORDER — PROMETHAZINE HYDROCHLORIDE 6.25 MG/5ML
6.25 SYRUP ORAL EVERY 4 HOURS PRN
Status: DISCONTINUED | OUTPATIENT
Start: 2023-01-01 | End: 2023-01-01 | Stop reason: HOSPADM

## 2023-01-01 RX ADMIN — ENOXAPARIN SODIUM 70 MG: 100 INJECTION SUBCUTANEOUS at 06:25

## 2023-01-01 RX ADMIN — METOPROLOL TARTRATE 37.5 MG: 25 TABLET, FILM COATED ORAL at 20:19

## 2023-01-01 RX ADMIN — METOPROLOL TARTRATE 5 MG: 5 INJECTION, SOLUTION INTRAVENOUS at 17:19

## 2023-01-01 RX ADMIN — LORAZEPAM 1 MG: 2 INJECTION INTRAMUSCULAR; INTRAVENOUS at 00:36

## 2023-01-01 RX ADMIN — Medication 10 ML: at 09:13

## 2023-01-01 RX ADMIN — ATORVASTATIN CALCIUM 80 MG: 80 TABLET, FILM COATED ORAL at 20:10

## 2023-01-01 RX ADMIN — CEFTRIAXONE SODIUM 1 G: 1 INJECTION, POWDER, FOR SOLUTION INTRAMUSCULAR; INTRAVENOUS at 04:55

## 2023-01-01 RX ADMIN — HYDROMORPHONE HYDROCHLORIDE 0.5 MG: 1 INJECTION, SOLUTION INTRAMUSCULAR; INTRAVENOUS; SUBCUTANEOUS at 22:38

## 2023-01-01 RX ADMIN — GLYCOPYRROLATE 0.4 MG: 0.2 INJECTION INTRAMUSCULAR; INTRAVENOUS at 05:28

## 2023-01-01 RX ADMIN — LACOSAMIDE 50 MG: 50 TABLET, FILM COATED ORAL at 14:57

## 2023-01-01 RX ADMIN — HYDROCODONE BITARTRATE AND ACETAMINOPHEN 1 TABLET: 5; 325 TABLET ORAL at 13:02

## 2023-01-01 RX ADMIN — Medication 10 ML: at 08:53

## 2023-01-01 RX ADMIN — Medication 10 ML: at 08:43

## 2023-01-01 RX ADMIN — GLYCOPYRROLATE 0.4 MG: 0.2 INJECTION INTRAMUSCULAR; INTRAVENOUS at 00:36

## 2023-01-01 RX ADMIN — ACETAMINOPHEN 650 MG: 325 TABLET, FILM COATED ORAL at 08:41

## 2023-01-01 RX ADMIN — LACOSAMIDE 50 MG: 50 TABLET, FILM COATED ORAL at 02:13

## 2023-01-01 RX ADMIN — HYDROMORPHONE HYDROCHLORIDE 0.5 MG: 1 INJECTION, SOLUTION INTRAMUSCULAR; INTRAVENOUS; SUBCUTANEOUS at 21:38

## 2023-01-01 RX ADMIN — METOPROLOL TARTRATE 37.5 MG: 25 TABLET, FILM COATED ORAL at 08:43

## 2023-01-01 RX ADMIN — ATORVASTATIN CALCIUM 40 MG: 20 TABLET, FILM COATED ORAL at 20:24

## 2023-01-01 RX ADMIN — LORAZEPAM 2 MG: 2 INJECTION INTRAMUSCULAR; INTRAVENOUS at 11:33

## 2023-01-01 RX ADMIN — SODIUM CHLORIDE 100 ML/HR: 9 INJECTION, SOLUTION INTRAVENOUS at 19:01

## 2023-01-01 RX ADMIN — CEFTRIAXONE SODIUM 1 G: 1 INJECTION, POWDER, FOR SOLUTION INTRAMUSCULAR; INTRAVENOUS at 05:03

## 2023-01-01 RX ADMIN — PANTOPRAZOLE SODIUM 40 MG: 40 TABLET, DELAYED RELEASE ORAL at 05:16

## 2023-01-01 RX ADMIN — Medication 10 ML: at 20:21

## 2023-01-01 RX ADMIN — POTASSIUM CHLORIDE 10 MEQ: 7.46 INJECTION, SOLUTION INTRAVENOUS at 14:45

## 2023-01-01 RX ADMIN — ENOXAPARIN SODIUM 70 MG: 100 INJECTION SUBCUTANEOUS at 04:55

## 2023-01-01 RX ADMIN — GLYCOPYRROLATE 0.4 MG: 0.2 INJECTION INTRAMUSCULAR; INTRAVENOUS at 11:33

## 2023-01-01 RX ADMIN — POTASSIUM CHLORIDE, DEXTROSE MONOHYDRATE AND SODIUM CHLORIDE 75 ML/HR: 150; 5; 900 INJECTION, SOLUTION INTRAVENOUS at 09:57

## 2023-01-01 RX ADMIN — ATORVASTATIN CALCIUM 40 MG: 20 TABLET, FILM COATED ORAL at 20:45

## 2023-01-01 RX ADMIN — MORPHINE SULFATE 2 MG: 2 INJECTION, SOLUTION INTRAMUSCULAR; INTRAVENOUS at 19:58

## 2023-01-01 RX ADMIN — LACOSAMIDE 50 MG: 50 TABLET, FILM COATED ORAL at 14:23

## 2023-01-01 RX ADMIN — PHENYTOIN 50 MG: 50 TABLET, CHEWABLE ORAL at 09:11

## 2023-01-01 RX ADMIN — METOPROLOL TARTRATE 5 MG: 1 INJECTION, SOLUTION INTRAVENOUS at 13:47

## 2023-01-01 RX ADMIN — METOPROLOL TARTRATE 37.5 MG: 25 TABLET, FILM COATED ORAL at 20:02

## 2023-01-01 RX ADMIN — MORPHINE SULFATE 2 MG: 2 INJECTION, SOLUTION INTRAMUSCULAR; INTRAVENOUS at 16:43

## 2023-01-01 RX ADMIN — METOPROLOL TARTRATE 37.5 MG: 25 TABLET, FILM COATED ORAL at 08:47

## 2023-01-01 RX ADMIN — APIXABAN 5 MG: 5 TABLET, FILM COATED ORAL at 11:12

## 2023-01-01 RX ADMIN — METOPROLOL TARTRATE 25 MG: 25 TABLET, FILM COATED ORAL at 20:02

## 2023-01-01 RX ADMIN — SCOPALAMINE 1 PATCH: 1 PATCH, EXTENDED RELEASE TRANSDERMAL at 23:35

## 2023-01-01 RX ADMIN — PHENYTOIN 50 MG: 50 TABLET, CHEWABLE ORAL at 20:10

## 2023-01-01 RX ADMIN — POTASSIUM CHLORIDE 10 MEQ: 7.46 INJECTION, SOLUTION INTRAVENOUS at 13:20

## 2023-01-01 RX ADMIN — METOPROLOL TARTRATE 5 MG: 1 INJECTION, SOLUTION INTRAVENOUS at 21:30

## 2023-01-01 RX ADMIN — Medication 10 ML: at 09:46

## 2023-01-01 RX ADMIN — SODIUM CHLORIDE 100 ML/HR: 9 INJECTION, SOLUTION INTRAVENOUS at 05:51

## 2023-01-01 RX ADMIN — HYDROMORPHONE HYDROCHLORIDE 0.5 MG: 1 INJECTION, SOLUTION INTRAMUSCULAR; INTRAVENOUS; SUBCUTANEOUS at 05:28

## 2023-01-01 RX ADMIN — LORAZEPAM 2 MG: 2 INJECTION INTRAMUSCULAR; INTRAVENOUS at 05:45

## 2023-01-01 RX ADMIN — LACOSAMIDE 50 MG: 50 TABLET, FILM COATED ORAL at 11:12

## 2023-01-01 RX ADMIN — MORPHINE SULFATE 2 MG: 2 INJECTION, SOLUTION INTRAMUSCULAR; INTRAVENOUS at 05:08

## 2023-01-01 RX ADMIN — GLYCOPYRROLATE 0.4 MG: 0.2 INJECTION INTRAMUSCULAR; INTRAVENOUS at 08:56

## 2023-01-01 RX ADMIN — POTASSIUM CHLORIDE 10 MEQ: 7.46 INJECTION, SOLUTION INTRAVENOUS at 17:28

## 2023-01-01 RX ADMIN — POTASSIUM CHLORIDE 10 MEQ: 7.46 INJECTION, SOLUTION INTRAVENOUS at 19:58

## 2023-01-01 RX ADMIN — HYDROMORPHONE HYDROCHLORIDE 1 MG: 1 INJECTION, SOLUTION INTRAMUSCULAR; INTRAVENOUS; SUBCUTANEOUS at 08:57

## 2023-01-01 RX ADMIN — MORPHINE SULFATE 2 MG: 2 INJECTION, SOLUTION INTRAMUSCULAR; INTRAVENOUS at 01:06

## 2023-01-01 RX ADMIN — ENOXAPARIN SODIUM 70 MG: 100 INJECTION SUBCUTANEOUS at 17:19

## 2023-01-01 RX ADMIN — ATORVASTATIN CALCIUM 40 MG: 20 TABLET, FILM COATED ORAL at 20:02

## 2023-01-01 RX ADMIN — PANTOPRAZOLE SODIUM 40 MG: 40 TABLET, DELAYED RELEASE ORAL at 05:03

## 2023-01-01 RX ADMIN — CEFTRIAXONE SODIUM 1 G: 1 INJECTION, POWDER, FOR SOLUTION INTRAMUSCULAR; INTRAVENOUS at 05:59

## 2023-01-01 RX ADMIN — HYDROCODONE BITARTRATE AND ACETAMINOPHEN 1 TABLET: 5; 325 TABLET ORAL at 22:39

## 2023-01-01 RX ADMIN — ENOXAPARIN SODIUM 70 MG: 100 INJECTION SUBCUTANEOUS at 05:52

## 2023-01-01 RX ADMIN — ENOXAPARIN SODIUM 70 MG: 100 INJECTION SUBCUTANEOUS at 18:22

## 2023-01-01 RX ADMIN — PHENYTOIN 50 MG: 50 TABLET, CHEWABLE ORAL at 18:25

## 2023-01-01 RX ADMIN — METOPROLOL TARTRATE 25 MG: 25 TABLET, FILM COATED ORAL at 08:30

## 2023-01-01 RX ADMIN — LACOSAMIDE 50 MG: 50 TABLET, FILM COATED ORAL at 14:04

## 2023-01-01 RX ADMIN — Medication 10 ML: at 08:40

## 2023-01-01 RX ADMIN — POTASSIUM CHLORIDE 10 MEQ: 7.46 INJECTION, SOLUTION INTRAVENOUS at 15:59

## 2023-01-01 RX ADMIN — METOPROLOL TARTRATE 5 MG: 1 INJECTION, SOLUTION INTRAVENOUS at 01:42

## 2023-01-01 RX ADMIN — HYDROMORPHONE HYDROCHLORIDE 0.5 MG: 1 INJECTION, SOLUTION INTRAMUSCULAR; INTRAVENOUS; SUBCUTANEOUS at 00:36

## 2023-01-01 RX ADMIN — Medication 10 ML: at 08:47

## 2023-01-01 RX ADMIN — METOPROLOL TARTRATE 37.5 MG: 25 TABLET, FILM COATED ORAL at 08:38

## 2023-01-01 RX ADMIN — PHENYTOIN 50 MG: 50 TABLET, CHEWABLE ORAL at 20:02

## 2023-01-01 RX ADMIN — ENOXAPARIN SODIUM 70 MG: 100 INJECTION SUBCUTANEOUS at 05:11

## 2023-01-01 RX ADMIN — LACOSAMIDE 50 MG: 50 TABLET, FILM COATED ORAL at 01:56

## 2023-01-01 RX ADMIN — LORAZEPAM 2 MG: 2 INJECTION INTRAMUSCULAR; INTRAVENOUS at 08:56

## 2023-01-01 RX ADMIN — PHENYTOIN 50 MG: 50 TABLET, CHEWABLE ORAL at 17:20

## 2023-01-01 RX ADMIN — PANTOPRAZOLE SODIUM 40 MG: 40 TABLET, DELAYED RELEASE ORAL at 04:55

## 2023-01-01 RX ADMIN — POTASSIUM CHLORIDE, DEXTROSE MONOHYDRATE AND SODIUM CHLORIDE 75 ML/HR: 150; 5; 900 INJECTION, SOLUTION INTRAVENOUS at 21:24

## 2023-01-01 RX ADMIN — MORPHINE SULFATE 4 MG: 2 INJECTION, SOLUTION INTRAMUSCULAR; INTRAVENOUS at 14:46

## 2023-01-01 RX ADMIN — METOPROLOL TARTRATE 25 MG: 25 TABLET, FILM COATED ORAL at 20:10

## 2023-01-01 RX ADMIN — SODIUM CHLORIDE 100 ML/HR: 9 INJECTION, SOLUTION INTRAVENOUS at 14:22

## 2023-01-01 RX ADMIN — METOPROLOL TARTRATE 5 MG: 1 INJECTION, SOLUTION INTRAVENOUS at 02:49

## 2023-01-01 RX ADMIN — ENOXAPARIN SODIUM 70 MG: 100 INJECTION SUBCUTANEOUS at 20:00

## 2023-01-01 RX ADMIN — Medication 10 ML: at 09:11

## 2023-01-01 RX ADMIN — Medication 10 ML: at 20:46

## 2023-01-01 RX ADMIN — MORPHINE SULFATE 2 MG: 2 INJECTION, SOLUTION INTRAMUSCULAR; INTRAVENOUS at 14:51

## 2023-01-01 RX ADMIN — PANTOPRAZOLE SODIUM 40 MG: 40 TABLET, DELAYED RELEASE ORAL at 05:52

## 2023-01-01 RX ADMIN — CEFTRIAXONE SODIUM 1 G: 1 INJECTION, POWDER, FOR SOLUTION INTRAMUSCULAR; INTRAVENOUS at 05:52

## 2023-01-01 RX ADMIN — SODIUM CHLORIDE 100 ML/HR: 9 INJECTION, SOLUTION INTRAVENOUS at 05:02

## 2023-01-01 RX ADMIN — LORAZEPAM 2 MG: 2 INJECTION INTRAMUSCULAR; INTRAVENOUS at 16:42

## 2023-01-01 RX ADMIN — Medication 10 ML: at 20:00

## 2023-01-01 RX ADMIN — PHENYTOIN 50 MG: 50 TABLET, CHEWABLE ORAL at 08:30

## 2023-01-01 RX ADMIN — ENOXAPARIN SODIUM 70 MG: 100 INJECTION SUBCUTANEOUS at 06:02

## 2023-01-01 RX ADMIN — METOPROLOL TARTRATE 25 MG: 25 TABLET, FILM COATED ORAL at 23:05

## 2023-01-01 RX ADMIN — METOPROLOL TARTRATE 5 MG: 1 INJECTION, SOLUTION INTRAVENOUS at 21:15

## 2023-01-01 RX ADMIN — GLYCOPYRROLATE 0.2 MG: 0.2 INJECTION INTRAMUSCULAR; INTRAVENOUS at 20:50

## 2023-01-01 RX ADMIN — LORAZEPAM 2 MG: 2 INJECTION INTRAMUSCULAR; INTRAVENOUS at 13:10

## 2023-01-01 RX ADMIN — ACETAMINOPHEN 500 MG: 500 TABLET, FILM COATED ORAL at 04:35

## 2023-01-01 RX ADMIN — POTASSIUM CHLORIDE 10 MEQ: 7.46 INJECTION, SOLUTION INTRAVENOUS at 21:24

## 2023-01-01 RX ADMIN — ONDANSETRON 4 MG: 2 INJECTION INTRAMUSCULAR; INTRAVENOUS at 14:46

## 2023-01-01 RX ADMIN — LACOSAMIDE 50 MG: 50 TABLET, FILM COATED ORAL at 01:00

## 2023-01-01 RX ADMIN — Medication 10 ML: at 08:38

## 2023-01-01 RX ADMIN — GLYCOPYRROLATE 0.4 MG: 0.2 INJECTION INTRAMUSCULAR; INTRAVENOUS at 03:02

## 2023-01-01 RX ADMIN — METOPROLOL TARTRATE 37.5 MG: 25 TABLET, FILM COATED ORAL at 20:24

## 2023-01-01 RX ADMIN — METOPROLOL TARTRATE 37.5 MG: 25 TABLET, FILM COATED ORAL at 09:46

## 2023-01-01 RX ADMIN — SODIUM CHLORIDE 500 ML: 9 INJECTION, SOLUTION INTRAVENOUS at 11:55

## 2023-01-01 RX ADMIN — ENOXAPARIN SODIUM 70 MG: 100 INJECTION SUBCUTANEOUS at 06:38

## 2023-01-01 RX ADMIN — DILTIAZEM HYDROCHLORIDE 10 MG: 5 INJECTION, SOLUTION INTRAVENOUS at 06:01

## 2023-01-01 RX ADMIN — PANTOPRAZOLE SODIUM 40 MG: 40 TABLET, DELAYED RELEASE ORAL at 06:38

## 2023-01-01 RX ADMIN — DIGOXIN 250 MCG: 250 INJECTION, SOLUTION INTRAMUSCULAR; INTRAVENOUS at 14:48

## 2023-01-01 RX ADMIN — CEFTRIAXONE SODIUM 1 G: 1 INJECTION, POWDER, FOR SOLUTION INTRAMUSCULAR; INTRAVENOUS at 08:39

## 2023-01-01 RX ADMIN — CALCIUM GLUCONATE 1 G: 20 INJECTION, SOLUTION INTRAVENOUS at 13:21

## 2023-01-01 RX ADMIN — SODIUM CHLORIDE 100 ML/HR: 9 INJECTION, SOLUTION INTRAVENOUS at 02:45

## 2023-01-01 RX ADMIN — DILTIAZEM HYDROCHLORIDE 10 MG: 5 INJECTION INTRAVENOUS at 22:49

## 2023-01-01 RX ADMIN — ENOXAPARIN SODIUM 70 MG: 100 INJECTION SUBCUTANEOUS at 05:03

## 2023-01-01 RX ADMIN — HYDROMORPHONE HYDROCHLORIDE 1.5 MG: 2 INJECTION, SOLUTION INTRAMUSCULAR; INTRAVENOUS; SUBCUTANEOUS at 13:10

## 2023-01-01 RX ADMIN — CEFTRIAXONE SODIUM 1 G: 1 INJECTION, POWDER, FOR SOLUTION INTRAMUSCULAR; INTRAVENOUS at 05:13

## 2023-01-01 RX ADMIN — SODIUM CHLORIDE 100 ML/HR: 9 INJECTION, SOLUTION INTRAVENOUS at 14:18

## 2023-01-01 RX ADMIN — ENOXAPARIN SODIUM 70 MG: 100 INJECTION SUBCUTANEOUS at 18:25

## 2023-01-01 RX ADMIN — GLYCOPYRROLATE 0.4 MG: 0.2 INJECTION INTRAMUSCULAR; INTRAVENOUS at 22:38

## 2023-01-01 RX ADMIN — MAGNESIUM SULFATE IN DEXTROSE 1 G: 10 INJECTION, SOLUTION INTRAVENOUS at 13:21

## 2023-01-01 RX ADMIN — MORPHINE SULFATE 2 MG: 2 INJECTION, SOLUTION INTRAMUSCULAR; INTRAVENOUS at 09:43

## 2023-01-01 RX ADMIN — ENOXAPARIN SODIUM 70 MG: 100 INJECTION SUBCUTANEOUS at 18:11

## 2023-01-01 RX ADMIN — IOPAMIDOL 150 ML: 755 INJECTION, SOLUTION INTRAVENOUS at 16:56

## 2023-01-01 RX ADMIN — PHENYTOIN 50 MG: 50 TABLET, CHEWABLE ORAL at 09:46

## 2023-01-01 RX ADMIN — PANTOPRAZOLE SODIUM 40 MG: 40 TABLET, DELAYED RELEASE ORAL at 06:25

## 2023-01-01 RX ADMIN — METOPROLOL TARTRATE 5 MG: 1 INJECTION, SOLUTION INTRAVENOUS at 18:46

## 2023-01-01 RX ADMIN — HYDROCODONE BITARTRATE AND ACETAMINOPHEN 1 TABLET: 5; 325 TABLET ORAL at 03:35

## 2023-01-01 RX ADMIN — HYDROMORPHONE HYDROCHLORIDE 1 MG: 1 INJECTION, SOLUTION INTRAMUSCULAR; INTRAVENOUS; SUBCUTANEOUS at 11:33

## 2023-01-01 RX ADMIN — ENOXAPARIN SODIUM 70 MG: 100 INJECTION SUBCUTANEOUS at 18:06

## 2023-01-01 RX ADMIN — METOPROLOL TARTRATE 37.5 MG: 25 TABLET, FILM COATED ORAL at 20:44

## 2023-01-01 RX ADMIN — Medication 10 ML: at 20:27

## 2023-01-01 RX ADMIN — METOPROLOL TARTRATE 25 MG: 25 TABLET, FILM COATED ORAL at 09:11

## 2023-01-01 RX ADMIN — ATORVASTATIN CALCIUM 40 MG: 20 TABLET, FILM COATED ORAL at 20:19

## 2023-01-01 RX ADMIN — LORAZEPAM 1 MG: 2 INJECTION INTRAMUSCULAR; INTRAVENOUS at 22:15

## 2023-02-22 PROBLEM — R56.9 SEIZURES (HCC): Status: ACTIVE | Noted: 2023-01-01

## 2023-02-22 PROBLEM — R51.9 ACUTE NONINTRACTABLE HEADACHE: Status: ACTIVE | Noted: 2023-01-01

## 2023-02-22 PROBLEM — I48.91 ATRIAL FIBRILLATION WITH SLOW VENTRICULAR RESPONSE: Status: ACTIVE | Noted: 2023-01-01

## 2023-02-22 PROBLEM — I63.9 ACUTE CVA (CEREBROVASCULAR ACCIDENT): Status: ACTIVE | Noted: 2023-01-01

## 2023-02-22 PROBLEM — N39.0 URINARY TRACT INFECTION WITHOUT HEMATURIA: Status: ACTIVE | Noted: 2023-01-01

## 2023-02-28 PROBLEM — R51.9 ACUTE NONINTRACTABLE HEADACHE: Status: RESOLVED | Noted: 2023-01-01 | Resolved: 2023-01-01

## 2023-02-28 PROBLEM — N39.0 URINARY TRACT INFECTION WITHOUT HEMATURIA: Status: RESOLVED | Noted: 2023-01-01 | Resolved: 2023-01-01

## 2023-03-10 PROBLEM — R55 SYNCOPE AND COLLAPSE: Status: ACTIVE | Noted: 2023-01-01

## 2023-03-10 NOTE — PROGRESS NOTES
"Enter Query Response Below      Query Response: I do not see CHF documented in any of the physician notes.  Do not see where patient is \"noted to have history of CHF\"            If applicable, please update the problem list.   Patient: Ina Bryant        : 1940  Account: [id number]           Admit Date: 2023        How to Respond to this query:       a. Click New Note     b. Answer query within the yellow box.                c. Update the Problem List, if applicable.      If you have any questions about this query contact me at: [email or web address]     Dr. Vigil,    Patient is noted to have a history of CHF.  Echo 23 showed EF 63.4%.  Treatment includes Cardizem, Metoprolol, and monitoring labs and vital signs.    Can you clarify the patient’s diagnosis as:    o Chronic diastolic CHF  o Chronic CHF (type unknown)  o Other (please specify): ______________  o Unable to determine      By submitting this query, we are merely seeking further clarification of documentation to accurately reflect all conditions that you are monitoring, evaluating, treating or that extend the hospitalization or utilize additional resources of care. Please utilize your independent clinical judgment when addressing the question(s) above.     This query and your response, once completed, will be entered into the legal medical record.    Sincerely,  Marielle Wade  [employer] Documentation [employer]     "

## 2023-03-10 NOTE — ED PROVIDER NOTES
EMERGENCY DEPARTMENT ENCOUNTER    Room Number:  16/16  Date seen:  3/10/2023  PCP: Provider, No Known  Historian: EMS      HPI:  Chief Complaint: Cardiac arrest  A complete HPI/ROS/PMH/PSH/SH/FH are unobtainable due to: Aphasia  Context: Ina Bryant is a 83 y.o. female who presents to the ED via EMS from a rehab center.  Per EMS the patient was at rehab and had a syncopal episode and then witnessed seizure.  Nursing home states she then went into full cardiopulmonary arrest and performed CPR for approximately 2 minutes with ROSC.  EMS states that on their arrival the patient was lying on her left side but had a pulse.  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  In route EMS states that the patient was bradycardic in the 30s and hypotensive in the 80s and gave her a dose of atropine with improvement of her heart rate of 60 and improvement of her blood pressure to 90.      PAST MEDICAL HISTORY  Active Ambulatory Problems     Diagnosis Date Noted   • Ear ache 04/03/2017   • Fever 04/03/2017   • Chest congestion 04/03/2017   • Atrial fibrillation with slow ventricular response (HCC) 02/22/2023   • Acid reflux    • Elevated cholesterol    • Seizures (HCC) 02/22/2023   • Acute CVA (cerebrovascular accident) (Formerly [facility]) 02/22/2023     Resolved Ambulatory Problems     Diagnosis Date Noted   • Urinary tract infection without hematuria 02/22/2023   • Acute nonintractable headache 02/22/2023     Past Medical History:   Diagnosis Date   • Gallbladder abscess          REVIEW OF SYSTEMS  All systems reviewed and negative except for those discussed in HPI.       PAST SURGICAL HISTORY  Past Surgical History:   Procedure Laterality Date   • ADENOIDECTOMY     • CHOLECYSTECTOMY     • THYROID SURGERY     • TONSILLECTOMY           FAMILY HISTORY  Family History   Problem Relation Age of Onset   • No Known Problems Mother    • No Known Problems Father          SOCIAL HISTORY  Social History     Socioeconomic  History   • Marital status:    Tobacco Use   • Smoking status: Never     Passive exposure: Never   • Smokeless tobacco: Never   Vaping Use   • Vaping Use: Never used   Substance and Sexual Activity   • Alcohol use: Never   • Drug use: Never   • Sexual activity: Defer         ALLERGIES  Patient has no known allergies.      PHYSICAL EXAM  ED Triage Vitals   Temp Pulse Resp BP SpO2   -- -- -- -- --      Temp src Heart Rate Source Patient Position BP Location FiO2 (%)   -- -- -- -- --       Physical Exam      GENERAL: 83-year-old female in mild distress  HENT: NCAT: nares patent: Neck supple: No C-spine tenderness  EYES: no scleral icterus: Pale conjunctiva  CV: regular rhythm, normal rate in the 60s: Patient does have significant tenderness to palpation of her chest after her CPR  RESPIRATORY: The patient has some grunting with respiration that I believe is secondary to her pain from likely rib fractures due to CPR  ABDOMEN: soft, NTND: Bowel sounds positive  MUSCULOSKELETAL: no deformity  NEURO: Awake but aphasic nonfocal exam  PSYCH: Appears anxious  SKIN: Pale    Vital signs and nursing notes reviewed.    PPE pt does not present with symptoms for COVID19; however, I was wearing a mask and goggles throughout all patient interaction.    LAB RESULTS  Recent Results (from the past 24 hour(s))   ECG 12 Lead Syncope    Collection Time: 03/10/23 11:50 AM   Result Value Ref Range    QT Interval 399 ms   Comprehensive Metabolic Panel    Collection Time: 03/10/23 11:52 AM    Specimen: Blood   Result Value Ref Range    Glucose 92 65 - 99 mg/dL    BUN 10 8 - 23 mg/dL    Creatinine 0.54 (L) 0.57 - 1.00 mg/dL    Sodium 142 136 - 145 mmol/L    Potassium 2.9 (L) 3.5 - 5.2 mmol/L    Chloride 116 (H) 98 - 107 mmol/L    CO2 19.0 (L) 22.0 - 29.0 mmol/L    Calcium 5.4 (C) 8.6 - 10.5 mg/dL    Total Protein 4.3 (L) 6.0 - 8.5 g/dL    Albumin 2.3 (L) 3.5 - 5.2 g/dL    ALT (SGPT) 11 1 - 33 U/L    AST (SGOT) 15 1 - 32 U/L    Alkaline  Phosphatase 88 39 - 117 U/L    Total Bilirubin 0.2 0.0 - 1.2 mg/dL    Globulin 2.0 gm/dL    A/G Ratio 1.2 g/dL    BUN/Creatinine Ratio 18.5 7.0 - 25.0    Anion Gap 7.0 5.0 - 15.0 mmol/L    eGFR 91.5 >60.0 mL/min/1.73   BNP    Collection Time: 03/10/23 11:52 AM    Specimen: Blood   Result Value Ref Range    proBNP 80.0 0.0 - 1,800.0 pg/mL   High Sensitivity Troponin T    Collection Time: 03/10/23 11:52 AM    Specimen: Blood   Result Value Ref Range    HS Troponin T 16 (H) <10 ng/L   Magnesium    Collection Time: 03/10/23 11:52 AM    Specimen: Blood   Result Value Ref Range    Magnesium 1.5 (L) 1.6 - 2.4 mg/dL   TSH    Collection Time: 03/10/23 11:52 AM    Specimen: Blood   Result Value Ref Range    TSH 0.700 0.270 - 4.200 uIU/mL   T4, Free    Collection Time: 03/10/23 11:52 AM    Specimen: Blood   Result Value Ref Range    Free T4 1.28 0.93 - 1.70 ng/dL   CK    Collection Time: 03/10/23 11:52 AM    Specimen: Blood   Result Value Ref Range    Creatine Kinase 51 20 - 180 U/L   Phenytoin Level, Total    Collection Time: 03/10/23 11:52 AM    Specimen: Blood   Result Value Ref Range    Phenytoin Level <0.8 (L) 10.0 - 20.0 mcg/mL   CBC Auto Differential    Collection Time: 03/10/23 11:52 AM    Specimen: Blood   Result Value Ref Range    WBC 5.13 3.40 - 10.80 10*3/mm3    RBC 3.81 3.77 - 5.28 10*6/mm3    Hemoglobin 12.3 12.0 - 15.9 g/dL    Hematocrit 36.5 34.0 - 46.6 %    MCV 95.8 79.0 - 97.0 fL    MCH 32.3 26.6 - 33.0 pg    MCHC 33.7 31.5 - 35.7 g/dL    RDW 13.4 12.3 - 15.4 %    RDW-SD 47.2 37.0 - 54.0 fl    MPV 9.8 6.0 - 12.0 fL    Platelets 244 140 - 450 10*3/mm3    Neutrophil % 43.8 42.7 - 76.0 %    Lymphocyte % 37.6 19.6 - 45.3 %    Monocyte % 15.8 (H) 5.0 - 12.0 %    Eosinophil % 0.4 0.3 - 6.2 %    Basophil % 0.6 0.0 - 1.5 %    Immature Grans % 1.8 (H) 0.0 - 0.5 %    Neutrophils, Absolute 2.25 1.70 - 7.00 10*3/mm3    Lymphocytes, Absolute 1.93 0.70 - 3.10 10*3/mm3    Monocytes, Absolute 0.81 0.10 - 0.90 10*3/mm3     Eosinophils, Absolute 0.02 0.00 - 0.40 10*3/mm3    Basophils, Absolute 0.03 0.00 - 0.20 10*3/mm3    Immature Grans, Absolute 0.09 (H) 0.00 - 0.05 10*3/mm3    nRBC 0.0 0.0 - 0.2 /100 WBC   Blood Gas, Arterial -    Collection Time: 03/10/23 12:04 PM    Specimen: Arterial Blood   Result Value Ref Range    Site Arterial: right radial     Alonso's Test Positive     pH, Arterial 7.404 7.350 - 7.450 pH units    pCO2, Arterial 37.8 35.0 - 45.0 mm Hg    pO2, Arterial 94.0 80.0 - 100.0 mm Hg    HCO3, Arterial 23.6 22.0 - 28.0 mmol/L    Base Excess, Arterial -0.9 (L) 0.0 - 2.0 mmol/L    O2 Saturation Calculated 97.4 92.0 - 99.0 %    Barometric Pressure for Blood Gas 749.2 mmHg    Modality Cannula     Flow Rate 4 lpm    Rate 28 Breaths/minute   High Sensitivity Troponin T 2Hr    Collection Time: 03/10/23  1:36 PM    Specimen: Blood   Result Value Ref Range    HS Troponin T 29 (H) <10 ng/L    Troponin T Delta 13 (C) >=-4 - <+4 ng/L   Calcium, Ionized    Collection Time: 03/10/23  1:36 PM    Specimen: Blood   Result Value Ref Range    Ionized Calcium 1.22 1.15 - 1.35 mmol/L    Ionized Calcium 4.9 4.6 - 5.4 mg/dL   Urinalysis With Culture If Indicated - Straight Cath    Collection Time: 03/10/23  2:30 PM    Specimen: Straight Cath; Urine   Result Value Ref Range    Color, UA Yellow Yellow, Straw    Appearance, UA Clear Clear    pH, UA 6.0 5.0 - 8.0    Specific Gravity, UA 1.021 1.005 - 1.030    Glucose, UA Negative Negative    Ketones, UA Negative Negative    Bilirubin, UA Negative Negative    Blood, UA Small (1+) (A) Negative    Protein, UA 30 mg/dL (1+) (A) Negative    Leuk Esterase, UA Negative Negative    Nitrite, UA Negative Negative    Urobilinogen, UA 1.0 E.U./dL 0.2 - 1.0 E.U./dL   Urinalysis, Microscopic Only - Straight Cath    Collection Time: 03/10/23  2:30 PM    Specimen: Straight Cath; Urine   Result Value Ref Range    RBC, UA 3-5 (A) None Seen, 0-2 /HPF    WBC, UA 0-2 None Seen, 0-2 /HPF    Bacteria, UA Trace (A) None  Seen /HPF    Squamous Epithelial Cells, UA 0-2 None Seen, 0-2 /HPF    Hyaline Casts, UA 3-6 None Seen /LPF    Methodology Manual Light Microscopy    ECG 12 Lead Chest Pain    Collection Time: 03/10/23  2:32 PM   Result Value Ref Range    QT Interval 394 ms       Ordered the above labs and reviewed the results.        RADIOLOGY  CT Head Without Contrast    Result Date: 3/10/2023  CT HEAD WITHOUT CONTRAST  CLINICAL HISTORY: The patient has a history of a recent stroke. Currently complains of syncope.  TECHNIQUE: CT scan of the head was obtained with 3 mm axial soft tissue algorithm images. No intravenous contrast was administered. Sagittal and coronal reconstructions were obtained.  COMPARISON: CT head and MRI brain dated 02/22/2023.  FINDINGS:   There is an abnormal hypodensity within the left caudate head, anterior limb of the left internal capsule, and lentiform nucleus that is compatible with a late subacute to chronic infarct within lenticulostriate distribution. Late subacute blood degradation products were seen at this site on the previous brain MRI dated 02/22/2023. This area measures up to approximately 2.4 x 1.7 cm in greatest axial dimensions and demonstrates no significant interval change since the prior study dated 02/22/2023. A small chronic infarct within the right supramarginal gyrus within the right MCA distribution measuring up to 1.6 x 0.6 cm in greatest axial dimensions is again noted. Again noted is a small chronic infarct within the superior aspect of the left cerebellar hemisphere within the left cerebellar artery distribution. This measures up to approximately 5 mm in diameter.  The ventricles, sulci, and cisterns are age appropriate. Mild changes of chronic small vessel ischemic phenomena are appreciated The basal ganglia and thalami are unremarkable in appearance. Atherosclerotic calcifications are appreciated within the intracranial vasculature.  Incidental note is made of findings of a  prior right mastoidectomy.       There is no CT evidence to suggest acute intracranial pathology. Again noted is a late subacute to chronic infarct within lenticulostriate distribution involving the left caudate head, the anterior limb of the left internal capsule, and the anterior aspect of the left lentiform nucleus. No significant interval change is seen when compared to the prior study. Additionally, there are findings compatible with a chronic infarct within the lateral aspect of the right inferior parietal lobule within the right MCA distribution. Subcentimeter chronic infarct is seen within the left cerebellar hemisphere within the left superior cerebellar artery distribution.  Radiation dose reduction techniques were utilized, including automated exposure control and exposure modulation based on body size.       CT Chest Without Contrast Diagnostic    Result Date: 3/10/2023  CT SCAN OF THE CHEST WITHOUT CONTRAST  HISTORY: Chest pain and shortness of breath. Recent syncopal episode followed by seizure and cardiopulmonary arrest with resuscitation.  The CT scan was performed as an emergency procedure through the chest without contrast. The following findings are present: 1. There is a slightly displaced fracture involving the distal-third of the sternum with very small substernal hematoma that measures 1 cm in AP diameter, 1.5 cm in transverse diameter, and extends along the posterior margin of the sternum for a distance of 4.8 cm. There are also nondisplaced fractures involving the anterior aspects of the left and right 2nd through 5th ribs. There is no compression fracture in the thoracic spine. 2. The lungs are well-expanded and clear except for some minimal dependent atelectasis at the lung bases. There is no mediastinal or hilar or axillary adenopathy. There is no pericardial effusion. 3. There is mild ectasia of the thoracic aorta with the ascending aorta measuring 3.6 cm and the descending thoracic  aorta generally measuring 2.9 cm. There is coronary artery calcification present. 4. The CT images through the upper liver, spleen, both adrenal glands, and upper poles of both kidneys are unremarkable. 5. There are several right thyroid nodules measuring up to 1.4 cm. This represents a low density nodule that may be cystic. The left thyroid lobe is absent. This is unchanged from the recent CT angiography of the neck dated 02/22/2023. Further evaluation with thyroid ultrasound may be helpful on an elective basis.      Radiation dose reduction techniques were utilized, including automated exposure control and exposure modulation based on body size.  This report was finalized on 3/10/2023 2:26 PM by Dr. Rudi Victor M.D.      XR Chest 1 View    Result Date: 3/10/2023  PORTABLE CHEST  HISTORY: Shortness of breath.  COMPARISON: None.  A single view of the chest demonstrates a defibrillator paddle overlying the left heart border. The heart is within normal limits in size. There is minimal cephalization of the pulmonary vasculature. There is no evidence of consolidation, focal infiltrate or effusion.        Ordered the above noted radiological studies. Reviewed by me in PACS.            PROCEDURES  Critical Care  Performed by: Jono Buck MD  Authorized by: Jono Buck MD     Critical care provider statement:     Critical care time (minutes):  45    Critical care time was exclusive of:  Separately billable procedures and treating other patients    Critical care was necessary to treat or prevent imminent or life-threatening deterioration of the following conditions:  Circulatory failure and CNS failure or compromise    Critical care was time spent personally by me on the following activities:  Development of treatment plan with patient or surrogate, discussions with consultants, discussions with primary provider, evaluation of patient's response to treatment, examination of patient, obtaining history from  patient or surrogate, ordering and performing treatments and interventions, ordering and review of laboratory studies, ordering and review of radiographic studies, pulse oximetry, re-evaluation of patient's condition and review of old charts    I assumed direction of critical care for this patient from another provider in my specialty: no      Care discussed with: admitting provider                MEDICATIONS GIVEN IN ER  Medications   potassium chloride 10 mEq in 100 mL IVPB (0 mEq Intravenous Stopped 3/10/23 1443)     And   potassium chloride 10 mEq in 100 mL IVPB (10 mEq Intravenous New Bag 3/10/23 1445)     And   potassium chloride 10 mEq in 100 mL IVPB (has no administration in time range)     And   potassium chloride 10 mEq in 100 mL IVPB (has no administration in time range)     And   potassium chloride 10 mEq in 100 mL IVPB (has no administration in time range)     And   potassium chloride 10 mEq in 100 mL IVPB (has no administration in time range)   sodium chloride 0.9 % bolus 500 mL (0 mL Intravenous Stopped 3/10/23 1300)   calcium gluconate 1g/50ml 0.675% NaCl IV SOLN (0 g Intravenous Stopped 3/10/23 1430)   magnesium sulfate in D5W 1g/100mL (PREMIX) (0 g Intravenous Stopped 3/10/23 1447)   ondansetron (ZOFRAN) injection 4 mg (4 mg Intravenous Given 3/10/23 1446)   morphine injection 4 mg (4 mg Intravenous Given 3/10/23 1446)             MEDICAL DECISION MAKING, PROGRESS, and CONSULTS    All labs have been independently reviewed by me.  All radiology studies have been reviewed by me and I have also reviewed the radiology report.   EKG's independently viewed and interpreted by me.  Discussion below represents my analysis of pertinent findings related to patient's condition, differential diagnosis, treatment plan and final disposition.      Additional sources:  - Discussed/ obtained information from independent historians: I had multiple long discussions with the patient's son who is her POA.  He states  that at this point with her aphasia she has no quality of life and after prolonged discussion between myself, the son and his wife we have agreed the patient is a DO NOT RESUSCITATE    - External (non-ED) record review: The patient was admitted to the hospital from February 22 through February 28 for acute stroke with history of A-fib and slow ventricular response.  She also has a history of seizure disorder.  Her head CT showed areas of infarction with uncertain age.  The patient was noted to have A-fib with RVR and was seen by cardiology and placed on metoprolol.  It was felt that her stroke was secondary to an embolic event and the patient was put on Lovenox.  The plan was to wean the patient off her Dilantin and start her on Vimpat so they can take off the Lovenox and put her on Eliquis.    - Chronic or social conditions impacting care: The patient is currently living in a rehab facility and is aphasic from prior stroke    - Shared decision making: After shared decision-making discussion between myself, the patient and the son we agree the patient needs to be admitted for further evaluation and care.      Orders placed during this visit:  Orders Placed This Encounter   Procedures   • Critical Care   • XR Chest 1 View   • CT Head Without Contrast   • CT Chest Without Contrast Diagnostic   • Comprehensive Metabolic Panel   • Urinalysis With Culture If Indicated - Urine, Catheter   • Blood Gas, Arterial -   • BNP   • High Sensitivity Troponin T   • Magnesium   • TSH   • T4, Free   • CK   • Phenytoin Level, Total   • CBC Auto Differential   • Blood Gas, Arterial -   • High Sensitivity Troponin T 2Hr   • Calcium, Ionized   • Potassium   • Urinalysis, Microscopic Only - Urine, Clean Catch   • Monitor Blood Pressure   • Cardiac Monitoring   • Pulse Oximetry, Continuous   • Code Status and Medical Interventions:   • LCG (on-call MD unless specified)   • LIPDOMO (on-call MD unless specified)   • IP Palliative Care Nurse  Consult   • ECG 12 Lead Syncope   • ECG 12 Lead Chest Pain   • Inpatient Admission   • CBC & Differential         Differential diagnosis:  My differential diagnosis includes but is not limited to seizure, stroke, syncope, cardiopulmonary arrest, arrhythmia, hypotension, electrolyte abnormality or vasovagal episode      Independent interpretation of labs, radiology studies, and discussions with consultants:  ED Course as of 03/10/23 1529   Fri Mar 10, 2023   1158 The patient's son is now here who states that the patient has had seizure disorder for a long time and recently has been weaned off her Dilantin and started on Vimpat so that he can change her anticoagulation around for her A-fib. [GP]   1201 On arrival the patient's heart rate is in a normal sinus rhythm in the 60s and her initial blood pressure was 90 and thus have ordered IV fluid bolus while I will obtain EKG, chest x-ray, labs and head CT for further evaluation. [GP]   1201 EKG    EKG time: 1150  Rhythm/Rate: Normal sinus rhythm at 68  No Acute Ischemia  Non-Specific ST-T changes    Change compared to prior on 2/24/2023 when the patient was in rapid A-fib    Interpreted Contemporaneously by me.  Independently viewed by me     [GP]   1303 The patient's calcium came back at 5.4 with potassium of 2.9 and magnesium of 1.5 I will order an ionized calcium but I will go ahead and give the patient a dose of calcium and IV magnesium and potassium here in the emergency room. [GP]   1305 My independent interpretation of the patient's chest x-ray is no pneumonia or pneumothorax with mild vascular congestion [GP]   1345 I discussed the patient's head CT with Dr. Borges from radiology who states it is unchanged from prior. [GP]   1406 The patient's CT chest shows a sternal fracture and second through fifth rib fractures on both the left and right side.  This is all presumed from her CPR. [GP]   1415 On repeat examination the patient remains hemodynamically stable with  a heart rate in the 60s.  I advised the patient and her family of the multiple rib fractures and sternal fracture.  I advised them of the electrolyte abnormalities and that we are replacing them. [GP]   1416 The patient's repeat troponin was 29 with a delta of 13.  I will consult cardiology. [GP]   1416 After long discussion with the patient's son who is her POA and his wife we agree that the patient is a DO NOT RESUSCITATE. [GP]   1417 I will give the patient a dose of morphine for her chest pain which is likely secondary to her multiple rib fractures and sternal fracture.  However I will repeat an EKG as well. [GP]   1425 The patient's ionized calcium is normal. [GP]   1434 EKG #2    EKG time: 1432  Rhythm/Rate: Normal sinus rhythm at 66  No Acute Ischemia  Non-Specific ST-T changes    Similar compared to prior earlier today    Interpreted Contemporaneously by me.  Independently viewed by me     [GP]   1435 I just spoke with the patient's son again at length who is the patient's POA.  He again reiterates that he would like the patient to be a DO NOT RESUSCITATE and no IV vasopressors.  We have discussed palliative care and he would like a palliative care consult for further information. [GP]   1436 I just discussed the case with Dr. Harman from cardiology who has reviewed the patient's chart.  He is aware of the patient's recent admission and family's wishes for DNR and no vasopressors at this time. [GP]   1525 I discussed the case with Dr. Brown who will admit the patient to a telemetry bed for further evaluation and care.  He is aware of my consultation with cardiology and palliative care. [GP]      ED Course User Index  [GP] Jono Buck MD               DIAGNOSIS  Final diagnoses:   Syncope and collapse   Symptomatic bradycardia   Symptomatic hypotension   Closed fracture of sternum, unspecified portion of sternum, initial encounter   Multiple fractures of ribs, bilateral, init for clos fx    Hypocalcemia   Hypokalemia   Hypomagnesemia   History of seizure disorder   History of atrial fibrillation   Elevated troponin         DISPOSITION  ADMISSION    Discussed treatment plan and reason for admission with pt/family and admitting physician.  Pt/family voiced understanding of the plan for admission for further testing/treatment as needed.            Latest Documented Vital Signs:  As of 15:29 EST  BP- 157/82 HR- 66 Temp-   O2 sat- 98%--      --------------------  Please note that portions of this were completed with a voice recognition program.       Note Disclaimer: At [facility], we believe that sharing information builds trust and better relationships. You are receiving this note because you are receiving care at [facility] or recently visited. It is possible you will see health information before a provider has talked with you about it. This kind of information can be easy to misunderstand. To help you fully understand what it means for your health, we urge you to discuss this note with your provider.           Jono Buck MD  03/10/23 1523

## 2023-03-10 NOTE — PROGRESS NOTES
Clinical Pharmacy Services: Medication History    Ina Bryant is a 83 y.o. female presenting to [facility] for   Chief Complaint   Patient presents with   • Cardiac Arrest       She  has a past medical history of Acid reflux, Elevated cholesterol, and Gallbladder abscess.    Allergies as of 03/10/2023   • (No Known Allergies)       Medication information was obtained from: correction Paperwork  Pharmacy and Phone Number:     Prior to Admission Medications     Prescriptions Last Dose Informant Patient Reported? Taking?    albuterol (PROVENTIL HFA) 108 (90 BASE) MCG/ACT inhaler 3/10/2023 Nursing Home No Yes    Inhale 2 puffs Every 4 (Four) Hours As Needed for Wheezing or Shortness of Air.    apixaban (ELIQUIS) 5 MG tablet tablet 3/9/2023 Nursing Home No Yes    Take 1 tablet by mouth Every 12 (Twelve) Hours. Indications: Atrial Fibrillation    atorvastatin (LIPITOR) 40 MG tablet 3/9/2023 Nursing Home No Yes    Take 1 tablet by mouth Every Night.    cetirizine (zyrTEC) 10 MG tablet 3/9/2023 Nursing Home Yes Yes    Take 1 tablet by mouth Every Evening.    lacosamide (VIMPAT) 100 MG tablet tablet 3/9/2023 Nursing Home Yes Yes    Take 1 tablet by mouth 2 (Two) Times a Day.    lansoprazole (PREVACID) 15 MG capsule 3/9/2023 Nursing Home Yes Yes    Take 1 capsule by mouth Daily.    metoprolol tartrate 37.5 MG tablet 3/9/2023 Nursing Home No Yes    Take 37.5 mg by mouth Every 12 (Twelve) Hours.            Medication notes: Per nursing home pt was recently taking lacosamide 50mg bid from 2/28/23-3/2/23. On 3/3/23 pt lacosamide was increased to 100mg bid.     This medication list is complete to the best of my knowledge as of 3/10/2023    Please call if questions.    Dana Mccray  Medication History Technician   [phone]    3/10/2023 12:52 EST

## 2023-03-10 NOTE — ED NOTES
Pt presents from [facility] via EMS after a witnessed syncopal episode with reported seizure activity and cardiac arrest. CPR was initiated by staff, upon arrival by EMS noted pt did have a pulse. EKG obtained, Afib with HR in the 30s. Atropine 1mg administered, HR improved to the 70s. 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 FSBS 146.

## 2023-03-10 NOTE — ED NOTES
Nursing report ED to floor  Ina Bryant  83 y.o.  female    HPI :   Chief Complaint   Patient presents with    Cardiac Arrest       Admitting doctor:   Mauro Brown MD    Admitting diagnosis:   The primary encounter diagnosis was Syncope and collapse. Diagnoses of Symptomatic bradycardia, Symptomatic hypotension, Closed fracture of sternum, unspecified portion of sternum, initial encounter, Multiple fractures of ribs, bilateral, init for clos fx, Hypocalcemia, Hypokalemia, Hypomagnesemia, History of seizure disorder, History of atrial fibrillation, and Elevated troponin were also pertinent to this visit.    Code status:   Current Code Status       Date Active Code Status Order ID Comments User Context       3/10/2023 1439 No CPR (Do Not Attempt to Resuscitate) [id number]  Jono Buck MD ED        Question Answer    Code Status (Patient has no pulse and is not breathing) No CPR (Do Not Attempt to Resuscitate)    Medical Interventions (Patient has pulse or is breathing) Limited Support    Medical Intervention Limits: NO intubation (DNI)     NO antiarrhythmic drugs     NO cardioversion     NO vasopressors    Level Of Support Discussed With Health Care Surrogate    Release to patient Routine Release                    Allergies:   Patient has no known allergies.    Isolation:   No active isolations    Intake and Output    Intake/Output Summary (Last 24 hours) at 3/10/2023 1538  Last data filed at 3/10/2023 1430  Gross per 24 hour   Intake 550 ml   Output --   Net 550 ml       Weight:       03/10/23  1149   Weight: 73.5 kg (162 lb)       Most recent vitals:   Vitals:    03/10/23 1257 03/10/23 1258 03/10/23 1409 03/10/23 1431   BP:    157/82   BP Location:       Patient Position:       Pulse: 65 67 63 66   Resp:       SpO2: 99% 99% 97% 98%   Weight:       Height:           Active LDAs/IV Access:   Lines, Drains & Airways       Active LDAs       Name Placement date Placement time Site Days    Peripheral IV 03/10/23  1151 Anterior;Left;Upper Arm 03/10/23  1151  Arm  less than 1    Peripheral IV 03/10/23 1156 Anterior;Distal;Left Hand 03/10/23  1156  Hand  less than 1    Peripheral IV 03/10/23 1355 Anterior;Right Hand 03/10/23  1355  Hand  less than 1    Peripheral IV 03/10/23 1415 Right Antecubital 03/10/23  1415  Antecubital  less than 1    External Urinary Catheter 03/10/23  1436  --  less than 1                    Labs (abnormal labs have a star):   Labs Reviewed   COMPREHENSIVE METABOLIC PANEL - Abnormal; Notable for the following components:       Result Value    Creatinine 0.54 (*)     Potassium 2.9 (*)     Chloride 116 (*)     CO2 19.0 (*)     Calcium 5.4 (*)     Total Protein 4.3 (*)     Albumin 2.3 (*)     All other components within normal limits    Narrative:     GFR Normal >60  Chronic Kidney Disease <60  Kidney Failure <15    The GFR formula is only valid for adults with stable renal function between ages 18 and 70.   URINALYSIS W/ CULTURE IF INDICATED - Abnormal; Notable for the following components:    Blood, UA Small (1+) (*)     Protein, UA 30 mg/dL (1+) (*)     All other components within normal limits    Narrative:     In absence of clinical symptoms, the presence of pyuria, bacteria, and/or nitrites on the urinalysis result does not correlate with infection.   TROPONIN - Abnormal; Notable for the following components:    HS Troponin T 16 (*)     All other components within normal limits    Narrative:     High Sensitive Troponin T Reference Range:  <10.0 ng/L- Negative Female for AMI  <15.0 ng/L- Negative Male for AMI  >=10 - Abnormal Female indicating possible myocardial injury.  >=15 - Abnormal Male indicating possible myocardial injury.   Clinicians would have to utilize clinical acumen, EKG, Troponin, and serial changes to determine if it is an Acute Myocardial Infarction or myocardial injury due to an underlying chronic condition.        MAGNESIUM - Abnormal; Notable for the following components:     Magnesium 1.5 (*)     All other components within normal limits   PHENYTOIN LEVEL, TOTAL - Abnormal; Notable for the following components:    Phenytoin Level <0.8 (*)     All other components within normal limits   CBC WITH AUTO DIFFERENTIAL - Abnormal; Notable for the following components:    Monocyte % 15.8 (*)     Immature Grans % 1.8 (*)     Immature Grans, Absolute 0.09 (*)     All other components within normal limits   BLOOD GAS, ARTERIAL - Abnormal; Notable for the following components:    Base Excess, Arterial -0.9 (*)     All other components within normal limits   HIGH SENSITIVITIY TROPONIN T 2HR - Abnormal; Notable for the following components:    HS Troponin T 29 (*)     Troponin T Delta 13 (*)     All other components within normal limits    Narrative:     High Sensitive Troponin T Reference Range:  <10.0 ng/L- Negative Female for AMI  <15.0 ng/L- Negative Male for AMI  >=10 - Abnormal Female indicating possible myocardial injury.  >=15 - Abnormal Male indicating possible myocardial injury.   Clinicians would have to utilize clinical acumen, EKG, Troponin, and serial changes to determine if it is an Acute Myocardial Infarction or myocardial injury due to an underlying chronic condition.        URINALYSIS, MICROSCOPIC ONLY - Abnormal; Notable for the following components:    RBC, UA 3-5 (*)     Bacteria, UA Trace (*)     All other components within normal limits   BNP (IN-HOUSE) - Normal    Narrative:     Among patients with dyspnea, NT-proBNP is highly sensitive for the detection of acute congestive heart failure. In addition NT-proBNP of <300 pg/ml effectively rules out acute congestive heart failure with 99% negative predictive value.    Results may be falsely decreased if patient taking Biotin.     TSH - Normal   T4, FREE - Normal    Narrative:     Results may be falsely increased if patient taking Biotin.     CK - Normal   CALCIUM, IONIZED - Normal   BLOOD GAS, ARTERIAL   CBC AND DIFFERENTIAL     Narrative:     The following orders were created for panel order CBC & Differential.  Procedure                               Abnormality         Status                     ---------                               -----------         ------                     CBC Auto Differential[[id number]]        Abnormal            Final result                 Please view results for these tests on the individual orders.       EKG:   ECG 12 Lead Chest Pain   Final Result   HEART RATE= 66  bpm   RR Interval= 909  ms   ID Interval= 241  ms   P Horizontal Axis= -36  deg   P Front Axis= 58  deg   QRSD Interval= 96  ms   QT Interval= 394  ms   QRS Axis= 82  deg   T Wave Axis= 57  deg   - ABNORMAL ECG -   Sinus rhythm   Prolonged ID interval   Borderline right axis deviation   No change from previous tracing   Electronically Signed By: Bossman Brumfield ([facility]) 10-Mar-2023 15:13:55   Date and Time of Study: 2023-03-10 14:32:29      ECG 12 Lead Syncope   Final Result   HEART RATE= 68  bpm   RR Interval= 882  ms   ID Interval= 217  ms   P Horizontal Axis= 27  deg   P Front Axis= -40  deg   QRSD Interval= 96  ms   QT Interval= 399  ms   QRS Axis= 74  deg   T Wave Axis= 43  deg   - BORDERLINE ECG -   Sinus rhythm- new    Borderline prolonged ID interval   Non-Specific STT wave changes   Electronically Signed By: Johnson Frausto ([facility]) 10-Mar-2023 14:08:12   Date and Time of Study: 2023-03-10 11:50:58          Meds given in ED:   Medications   potassium chloride 10 mEq in 100 mL IVPB (0 mEq Intravenous Stopped 3/10/23 1443)     And   potassium chloride 10 mEq in 100 mL IVPB (10 mEq Intravenous New Bag 3/10/23 1445)     And   potassium chloride 10 mEq in 100 mL IVPB (has no administration in time range)     And   potassium chloride 10 mEq in 100 mL IVPB (has no administration in time range)     And   potassium chloride 10 mEq in 100 mL IVPB (has no administration in time range)     And   potassium chloride 10 mEq in 100 mL IVPB (has no administration in  time range)   sodium chloride 0.9 % bolus 500 mL (0 mL Intravenous Stopped 3/10/23 1300)   calcium gluconate 1g/50ml 0.675% NaCl IV SOLN (0 g Intravenous Stopped 3/10/23 1430)   magnesium sulfate in D5W 1g/100mL (PREMIX) (0 g Intravenous Stopped 3/10/23 1447)   ondansetron (ZOFRAN) injection 4 mg (4 mg Intravenous Given 3/10/23 1446)   morphine injection 4 mg (4 mg Intravenous Given 3/10/23 1446)       Imaging results:  CT Head Without Contrast    Result Date: 3/10/2023   There is no CT evidence to suggest acute intracranial pathology. Again noted is a late subacute to chronic infarct within lenticulostriate distribution involving the left caudate head, the anterior limb of the left internal capsule, and the anterior aspect of the left lentiform nucleus. No significant interval change is seen when compared to the prior study. Additionally, there are findings compatible with a chronic infarct within the lateral aspect of the right inferior parietal lobule within the right MCA distribution. Subcentimeter chronic infarct is seen within the left cerebellar hemisphere within the left superior cerebellar artery distribution.  Radiation dose reduction techniques were utilized, including automated exposure control and exposure modulation based on body size.        Ambulatory status:   - Bed bound 8 rib fx    Social issues:   Social History     Socioeconomic History    Marital status:    Tobacco Use    Smoking status: Never     Passive exposure: Never    Smokeless tobacco: Never   Vaping Use    Vaping Use: Never used   Substance and Sexual Activity    Alcohol use: Never    Drug use: Never    Sexual activity: Defer       NIH Stroke Scale:         Registered Nurse, RN  03/10/23 15:38 EST

## 2023-03-11 NOTE — NURSING NOTE
0650 Patient up to unit at this time, VSS. Aphasic from prior stroke but is somewhat verbal. AAOX4. 0755 Spoke with MD Brown who gave verbal orders over the phone.

## 2023-03-11 NOTE — NURSING NOTE
Son at bedside, concerned patient has not voided all shift. Son stated the patient normally has to have a catheter in the hospital. Bladder scan showed greater than 400ml & bladder felt very firm upon palpation. I&O cath performed x1. 400ml of urine obtained.

## 2023-03-11 NOTE — PLAN OF CARE
Pt. AAOx4.Aphasic but able to verbalize some words. SR on the monitor. On 2L O2 NC. Family at bedside. SCD's placed. Accumax placed. 1000 Bladder scan - 152 ml. PRN Morphine given for pain.  1049 pt. Complaining of chest pain. STAT EKG and labs placed. Troponin- 178. 1153 Called MD Brown, waiting for call back. 1210 Spoke with the POA Gregg Bryant who stated he would like for his mom to go the go our palliative unit. POA papers obtained and placed in chart. 1230 MD Brown on unit spoke with the family. Waiting on bed. IV ativan given for restlessness. Foster cath placed, draining to bedside. Family in room with patient. Patient resting comfortably in the bed.      Problem: Fall Injury Risk  Goal: Absence of Fall and Fall-Related Injury  Intervention: Identify and Manage Contributors  Recent Flowsheet Documentation  Taken 3/11/2023 1804 by Tala Man RN  Medication Review/Management: medications reviewed  Taken 3/11/2023 1600 by Tala Man RN  Medication Review/Management: medications reviewed  Taken 3/11/2023 1400 by Tala Man RN  Medication Review/Management: medications reviewed  Taken 3/11/2023 1200 by Tala Man RN  Medication Review/Management: medications reviewed  Taken 3/11/2023 1000 by Tala Man RN  Medication Review/Management: medications reviewed  Taken 3/11/2023 0800 by Tala Man RN  Medication Review/Management: medications reviewed     Problem: Fall Injury Risk  Goal: Absence of Fall and Fall-Related Injury  Intervention: Promote Injury-Free Environment  Recent Flowsheet Documentation  Taken 3/11/2023 1804 by Tala Man RN  Safety Promotion/Fall Prevention:   safety round/check completed   room organization consistent   nonskid shoes/slippers when out of bed   clutter free environment maintained   assistive device/personal items within reach   activity supervised  Taken 3/11/2023 1600 by Tala Man RN  Safety Promotion/Fall Prevention:   safety  round/check completed   room organization consistent   nonskid shoes/slippers when out of bed   clutter free environment maintained   activity supervised   assistive device/personal items within reach  Taken 3/11/2023 1400 by Tala Man RN  Safety Promotion/Fall Prevention:   safety round/check completed   room organization consistent   nonskid shoes/slippers when out of bed   clutter free environment maintained   assistive device/personal items within reach   activity supervised  Taken 3/11/2023 1200 by Tala Man RN  Safety Promotion/Fall Prevention:   safety round/check completed   room organization consistent   nonskid shoes/slippers when out of bed   clutter free environment maintained   activity supervised   assistive device/personal items within reach  Taken 3/11/2023 1000 by Tala Man RN  Safety Promotion/Fall Prevention:   safety round/check completed   room organization consistent   nonskid shoes/slippers when out of bed   clutter free environment maintained   assistive device/personal items within reach   activity supervised  Taken 3/11/2023 0800 by Tala Man, RN  Safety Promotion/Fall Prevention:   safety round/check completed   room organization consistent   nonskid shoes/slippers when out of bed   clutter free environment maintained   activity supervised   assistive device/personal items within reach

## 2023-03-11 NOTE — H&P
History and physical    Primary care physician   Not known    Chief complaint  Cardiac arrest    History of present illness   83-year-old white female with history of hypertension hyperlipidemia seizure disorder CVA and atrial fibrillation on Eliquis brought to the emergency room at [facility] when she had a syncopal episode at subacute rehab followed by cardiopulmonary arrest and CPR performed.  In route to nursing home patient found to be hypotensive bradycardic.  Patient has been aphasic from prior stroke and patient evaluated in ER admitted and this morning patient son and daughter-in-law after prolonged discussion with me decided to pursue comfort care only and transferred to palliative care unit and allow natural death.  Patient unable to give history most of the history obtained from the daughter-in-law son nursing staff and reviewing the old chart.    PAST MEDICAL HISTORY            Diagnosis Date Noted   • Ear ache 04/03/2017   • Fever 04/03/2017   • Chest congestion 04/03/2017   • Atrial fibrillation  02/22/2023   • Acid reflux     • Elevated cholesterol     • Seizures  02/22/2023   • Cerebrovascular accident 02/22/2023       PAST SURGICAL HISTORY              Procedure Laterality Date   • ADENOIDECTOMY       • CHOLECYSTECTOMY       • THYROID SURGERY       • TONSILLECTOMY             FAMILY HISTORY           Problem Relation Age of Onset   • No Known Problems Mother     • No Known Problems Father       SOCIAL HISTORY                 Socioeconomic History   • Marital status:    Tobacco Use   • Smoking status: Never       Passive exposure: Never   • Smokeless tobacco: Never   Vaping Use   • Vaping Use: Never used   Substance and Sexual Activity   • Alcohol use: Never   • Drug use: Never   • Sexual activity: Defer         ALLERGIES  Patient has no known allergies.  Home medications reviewed     REVIEW OF SYSTEMS  All systems reviewed and negative except for those discussed in HPI.  "    PHYSICAL EXAM  Blood pressure 157/74, pulse 70, temperature 97.5 °F (36.4 °C), temperature source Oral, resp. rate 16, height 157.5 cm (62\"), weight 73.5 kg (162 lb), SpO2 96 %.    GENERAL:  Awake in mild distress  HENT: NCAT: nares patent: Neck supple: No C-spine tenderness  EYES: no scleral icterus: Pale conjunctiva  CV: regular rhythm, normal rate   RESPIRATORY:  Moving air bilaterally but decreased at bases  ABDOMEN: soft, NTND: Bowel sounds positive  MUSCULOSKELETAL: no deformity  NEURO: Awake but aphasic nonfocal exam  PSYCH: Appears anxious  SKIN: Pale    LAB RESULTS  Lab Results (last 24 hours)     Procedure Component Value Units Date/Time    High Sensitivity Troponin T [[id number]]  (Abnormal) Collected: 03/11/23 1109    Specimen: Blood Updated: 03/11/23 1151     HS Troponin T 178 ng/L     Narrative:      High Sensitive Troponin T Reference Range:  <10.0 ng/L- Negative Female for AMI  <15.0 ng/L- Negative Male for AMI  >=10 - Abnormal Female indicating possible myocardial injury.  >=15 - Abnormal Male indicating possible myocardial injury.   Clinicians would have to utilize clinical acumen, EKG, Troponin, and serial changes to determine if it is an Acute Myocardial Infarction or myocardial injury due to an underlying chronic condition.         T4, Free [[id number]]  (Normal) Collected: 03/11/23 1109    Specimen: Blood Updated: 03/11/23 1148     Free T4 1.49 ng/dL     Narrative:      Results may be falsely increased if patient taking Biotin.      Magnesium [[id number]]  (Normal) Collected: 03/11/23 1109    Specimen: Blood Updated: 03/11/23 1138     Magnesium 2.2 mg/dL     Potassium [[id number]]  (Normal) Collected: 03/11/23 1109    Specimen: Blood Updated: 03/11/23 1135     Potassium 4.5 mmol/L     Basic Metabolic Panel [[id number]]  (Abnormal) Collected: 03/11/23 0429    Specimen: Blood Updated: 03/11/23 0515     Glucose 124 mg/dL      BUN 16 mg/dL      Creatinine 0.83 mg/dL      Sodium 126 mmol/L      " Potassium 5.0 mmol/L      Comment: Slight hemolysis detected by analyzer. Results may be affected.        Chloride 97 mmol/L      CO2 18.2 mmol/L      Calcium 8.4 mg/dL      BUN/Creatinine Ratio 19.3     Anion Gap 10.8 mmol/L      eGFR 70.0 mL/min/1.73     Narrative:      GFR Normal >60  Chronic Kidney Disease <60  Kidney Failure <15    The GFR formula is only valid for adults with stable renal function between ages 18 and 70.    Potassium [[id number]]  (Normal) Collected: 03/11/23 0429    Specimen: Blood Updated: 03/11/23 0513     Potassium 5.0 mmol/L      Comment: Slight hemolysis detected by analyzer. Results may be affected.       CBC & Differential [[id number]]  (Abnormal) Collected: 03/11/23 0429    Specimen: Blood Updated: 03/11/23 0448    Narrative:      The following orders were created for panel order CBC & Differential.  Procedure                               Abnormality         Status                     ---------                               -----------         ------                     CBC Auto Differential[[id number]]        Abnormal            Final result                 Please view results for these tests on the individual orders.    CBC Auto Differential [[id number]]  (Abnormal) Collected: 03/11/23 0429    Specimen: Blood Updated: 03/11/23 0448     WBC 7.25 10*3/mm3      RBC 4.08 10*6/mm3      Hemoglobin 12.7 g/dL      Hematocrit 38.6 %      MCV 94.6 fL      MCH 31.1 pg      MCHC 32.9 g/dL      RDW 13.2 %      RDW-SD 46.0 fl      MPV 9.9 fL      Platelets 229 10*3/mm3      Neutrophil % 66.9 %      Lymphocyte % 20.3 %      Monocyte % 11.9 %      Eosinophil % 0.0 %      Basophil % 0.3 %      Immature Grans % 0.6 %      Neutrophils, Absolute 4.86 10*3/mm3      Lymphocytes, Absolute 1.47 10*3/mm3      Monocytes, Absolute 0.86 10*3/mm3      Eosinophils, Absolute 0.00 10*3/mm3      Basophils, Absolute 0.02 10*3/mm3      Immature Grans, Absolute 0.04 10*3/mm3      nRBC 0.0 /100 WBC     Urinalysis With  Culture If Indicated - Straight Cath [[id number]]  (Abnormal) Collected: 03/10/23 1430    Specimen: Urine from Straight Cath Updated: 03/10/23 1452     Color, UA Yellow     Appearance, UA Clear     pH, UA 6.0     Specific Gravity, UA 1.021     Glucose, UA Negative     Ketones, UA Negative     Bilirubin, UA Negative     Blood, UA Small (1+)     Protein, UA 30 mg/dL (1+)     Leuk Esterase, UA Negative     Nitrite, UA Negative     Urobilinogen, UA 1.0 E.U./dL    Narrative:      In absence of clinical symptoms, the presence of pyuria, bacteria, and/or nitrites on the urinalysis result does not correlate with infection.    Urinalysis, Microscopic Only - Straight Cath [[id number]]  (Abnormal) Collected: 03/10/23 1430    Specimen: Urine from Straight Cath Updated: 03/10/23 1452     RBC, UA 3-5 /HPF      WBC, UA 0-2 /HPF      Comment: Urine culture not indicated.        Bacteria, UA Trace /HPF      Squamous Epithelial Cells, UA 0-2 /HPF      Hyaline Casts, UA 3-6 /LPF      Methodology Manual Light Microscopy    Calcium, Ionized [[id number]]  (Normal) Collected: 03/10/23 1336    Specimen: Blood Updated: 03/10/23 1409     Ionized Calcium 1.22 mmol/L      Ionized Calcium 4.9 mg/dL     High Sensitivity Troponin T 2Hr [[id number]]  (Abnormal) Collected: 03/10/23 1336    Specimen: Blood Updated: 03/10/23 1409     HS Troponin T 29 ng/L      Troponin T Delta 13 ng/L     Narrative:      High Sensitive Troponin T Reference Range:  <10.0 ng/L- Negative Female for AMI  <15.0 ng/L- Negative Male for AMI  >=10 - Abnormal Female indicating possible myocardial injury.  >=15 - Abnormal Male indicating possible myocardial injury.   Clinicians would have to utilize clinical acumen, EKG, Troponin, and serial changes to determine if it is an Acute Myocardial Infarction or myocardial injury due to an underlying chronic condition.             Imaging Results (Last 24 Hours)     Procedure Component Value Units Date/Time    XR Chest 1 View [[id number]]  Collected: 03/10/23 1303     Updated: 03/10/23 2015    Narrative:      PORTABLE CHEST     HISTORY: Shortness of breath.     COMPARISON: None.     A single view of the chest demonstrates a defibrillator paddle overlying  the left heart border. The heart is within normal limits in size. There  is minimal cephalization of the pulmonary vasculature. There is no  evidence of consolidation, focal infiltrate or effusion.     This report was finalized on 3/10/2023 8:12 PM by Dr. Jack Nielson M.D.       CT Head Without Contrast [[id number]] Collected: 03/10/23 1342     Updated: 03/10/23 1636    Narrative:      CT HEAD WITHOUT CONTRAST     CLINICAL HISTORY: The patient has a history of a recent stroke.  Currently complains of syncope.     TECHNIQUE: CT scan of the head was obtained with 3 mm axial soft tissue  algorithm images. No intravenous contrast was administered. Sagittal and  coronal reconstructions were obtained.     COMPARISON: CT head and MRI brain dated 02/22/2023.     FINDINGS:       There is an abnormal hypodensity within the left caudate head, anterior  limb of the left internal capsule, and lentiform nucleus that is  compatible with a late subacute to chronic infarct within  lenticulostriate distribution. Late subacute blood degradation products  were seen at this site on the previous brain MRI dated 02/22/2023. This  area measures up to approximately 2.4 x 1.7 cm in greatest axial  dimensions and demonstrates no significant interval change since the  prior study dated 02/22/2023. A small chronic infarct within the right  supramarginal gyrus within the right MCA distribution measuring up to  1.6 x 0.6 cm in greatest axial dimensions is again noted. Again noted is  a small chronic infarct within the superior aspect of the left  cerebellar hemisphere within the left cerebellar artery distribution.  This measures up to approximately 5 mm in diameter.     The ventricles, sulci, and cisterns are age appropriate.  Mild changes of  chronic small vessel ischemic phenomena are appreciated The basal  ganglia and thalami are unremarkable in appearance. Atherosclerotic  calcifications are appreciated within the intracranial vasculature.      Incidental note is made of findings of a prior right mastoidectomy.       Impression:         There is no CT evidence to suggest acute intracranial pathology. Again  noted is a late subacute to chronic infarct within lenticulostriate  distribution involving the left caudate head, the anterior limb of the  left internal capsule, and the anterior aspect of the left lentiform  nucleus. No significant interval change is seen when compared to the  prior study. Additionally, there are findings compatible with a chronic  infarct within the lateral aspect of the right inferior parietal lobule  within the right MCA distribution. A subcentimeter chronic infarct is  seen within the left cerebellar hemisphere within the left superior  cerebellar artery distribution.     Radiation dose reduction techniques were utilized, including automated  exposure control and exposure modulation based on body size.     This report was finalized on 3/10/2023 4:33 PM by Dr. Richard Borges M.D.       CT Chest Without Contrast Diagnostic [[id number]] Collected: 03/10/23 1339     Updated: 03/10/23 1429    Narrative:      CT SCAN OF THE CHEST WITHOUT CONTRAST     HISTORY: Chest pain and shortness of breath. Recent syncopal episode  followed by seizure and cardiopulmonary arrest with resuscitation.     The CT scan was performed as an emergency procedure through the chest  without contrast. The following findings are present:  1. There is a slightly displaced fracture involving the distal-third of  the sternum with very small substernal hematoma that measures 1 cm in AP  diameter, 1.5 cm in transverse diameter, and extends along the posterior  margin of the sternum for a distance of 4.8 cm. There are also  nondisplaced fractures  involving the anterior aspects of the left and  right 2nd through 5th ribs. There is no compression fracture in the  thoracic spine.  2. The lungs are well-expanded and clear except for some minimal  dependent atelectasis at the lung bases. There is no mediastinal or  hilar or axillary adenopathy. There is no pericardial effusion.  3. There is mild ectasia of the thoracic aorta with the ascending aorta  measuring 3.6 cm and the descending thoracic aorta generally measuring  2.9 cm. There is coronary artery calcification present.  4. The CT images through the upper liver, spleen, both adrenal glands,  and upper poles of both kidneys are unremarkable.  5. There are several right thyroid nodules measuring up to 1.4 cm. This  represents a low density nodule that may be cystic. The left thyroid  lobe is absent. This is unchanged from the recent CT angiography of the  neck dated 02/22/2023. Further evaluation with thyroid ultrasound may be  helpful on an elective basis.                 Radiation dose reduction techniques were utilized, including automated  exposure control and exposure modulation based on body size.     This report was finalized on 3/10/2023 2:26 PM by Dr. Rudi Victor M.D.              ECG 12 Lead               Component  Ref Range & Units 10:59  (3/11/23) 1 d ago  (3/10/23) 1 d ago  (3/10/23) 2 wk ago  (2/24/23) 2 wk ago  (2/22/23) 2 wk ago  (2/22/23) 2 wk ago  (2/22/23)   QT Interval  ms 387 P  394  399  296  277  454  483 P    Resulting Agency BH ECG BH ECG BH ECG BH ECG BH ECG BH ECG BH ECG             HEART RATE= 75  bpm  RR Interval= 800  ms  MS Interval= 199  ms  P Horizontal Axis= -1  deg  P Front Axis= -6  deg  QRSD Interval= 92  ms  QT Interval= 387  ms  QRS Axis= 7  deg  T Wave Axis= 4  deg  - NORMAL ECG -  Sinus rhythm             Current Facility-Administered Medications:   •  albuterol (PROVENTIL) nebulizer solution 0.083% 2.5 mg/3mL, 2.5 mg, Nebulization, Q4H PRN, Mauro Brown MD  •   apixaban (ELIQUIS) tablet 5 mg, 5 mg, Oral, Q12H, Nela Brown MD  •  atorvastatin (LIPITOR) tablet 40 mg, 40 mg, Oral, Nightly, Nela Brown MD  •  HYDROcodone-acetaminophen (NORCO) 5-325 MG per tablet 1 tablet, 1 tablet, Oral, Q4H PRN, Nela Brown MD, 1 tablet at 03/11/23 1302  •  lacosamide (VIMPAT) tablet 100 mg, 100 mg, Oral, BID, Nela Brown MD  •  metoprolol tartrate (LOPRESSOR) tablet 12.5 mg, 12.5 mg, Oral, Q12H, Nela Brown MD  •  morphine injection 2 mg, 2 mg, Intravenous, Q4H PRN, Nela Brown MD, 2 mg at 03/11/23 0943  •  ondansetron (ZOFRAN) injection 4 mg, 4 mg, Intravenous, Q6H PRN, Nela Brown MD  •  pantoprazole (PROTONIX) EC tablet 40 mg, 40 mg, Oral, Q AM, Nela Brown MD  •  sodium chloride 0.9 % infusion, 75 mL/hr, Intravenous, Continuous, Nela Brown MD    ASSESSMENT  Status postcardiopulmonary arrest s/p code 300  Multiple rib fractures  Syncopal episode  Electrolyte imbalance  Elevated troponin  Paroxysmal atrial fibrillation  Hypertension  Hyperlipidemia  Seizure disorder  History of CVA with aphasia  Gastroesophageal reflux disease    PLAN  Patient admitted and provided with supportive care and cardiology consult obtained and this morning after long discussion with patient diagnosis prognosis and possible outcome patient POA decided to withdraw the care and transfer patient to palliative care unit for comfort care only and allow natural death and stop all scheduled medication and use routine palliative care orders.  I agree with patient's son and daughter-in-law and will comply with their wishes.    NELA BROWN MD

## 2023-03-11 NOTE — NURSING NOTE
0650 Patient up to unit at this time, VSS. Aphasic from prior stroke but is somewhat verbal. AAOX4. 0720 Spoke with MD Brown who gave verbal orders over the phone. 0745 The patients son stated he was under the impression someone from palliative would be coming to see them to speak  about options. This nurse called palliative and spoke to steven to who stated they did not have anyone until Sunday. Night shift nurse made aware. Patient resting in room at this time with family present.

## 2023-03-11 NOTE — CONSULTS
Date of Hospital Visit: 23  Encounter Provider: Dayne Honeycutt MD  Place of Service: [facility] CARDIOLOGY  Patient Name: Ina Bryant  :1940  Referral Provider: Mauro Brown MD    Chief complaint: Syncope, bradycardia    History of Present Illness    Ina Bryant is a 83 y.o. female who follows Dr. Verma in the outpatient clinic.    Patient has history for hypothyroidism, GERD, HLD, atrial fibrillation, CVA (aphasia)    Review of medical record reveals patient hospitalized  - 2023 for acute CVA with history of A-fib with RVR.  Patient also notes history of seizure disorder.  Patient was evaluated by cardiology services and placed on metoprolol.  It was thought that CVA was secondary to embolic event and patient was placed on Lovenox with plan to transition to apixaban.    Patient presents to the [facility] emergency department from rehab center secondary to syncopal episode and then a witnessed seizure.  Nursing home reports that patient was without pulse and several minutes of CPR were performed.  EMS reports when they arrived patient had pulse present.  Patient is nearly aphasic from previous stroke.  Per EMS patient bradycardic with heart rate in the 30s and hypotensive with systolic blood pressure in the 80s.  Patient received dose of atropine with improvement of heart rate to the 60s and improvement of blood pressure to the 90s.  Per EDMD discussion with patient's son who is her POA he reports that she has no quality of life and is a DO NOT RESUSCITATE.    Her daughter-in-law is present in the room with her.  She is uncomfortable due to chest pain.         Past Medical History:   Diagnosis Date   • Acid reflux    • Elevated cholesterol    • Gallbladder abscess        Past Surgical History:   Procedure Laterality Date   • ADENOIDECTOMY     • CHOLECYSTECTOMY     • THYROID SURGERY     • TONSILLECTOMY         Medications Prior to Admission    Medication Sig Dispense Refill Last Dose   • albuterol (PROVENTIL HFA) 108 (90 BASE) MCG/ACT inhaler Inhale 2 puffs Every 4 (Four) Hours As Needed for Wheezing or Shortness of Air. 1 inhaler 0 3/10/2023   • apixaban (ELIQUIS) 5 MG tablet tablet Take 1 tablet by mouth Every 12 (Twelve) Hours. Indications: Atrial Fibrillation 60 tablet  3/10/2023   • atorvastatin (LIPITOR) 40 MG tablet Take 1 tablet by mouth Every Night. 90 tablet  Past Week   • cetirizine (zyrTEC) 10 MG tablet Take 1 tablet by mouth Every Evening.   Past Week   • lacosamide (VIMPAT) 100 MG tablet tablet Take 1 tablet by mouth 2 (Two) Times a Day.   3/10/2023   • lansoprazole (PREVACID) 15 MG capsule Take 1 capsule by mouth Daily.   Past Week   • metoprolol tartrate 37.5 MG tablet Take 37.5 mg by mouth Every 12 (Twelve) Hours.   3/10/2023       Current Meds  Scheduled Meds:apixaban, 5 mg, Oral, Q12H  atorvastatin, 40 mg, Oral, Nightly  lacosamide, 100 mg, Oral, BID  metoprolol tartrate, 12.5 mg, Oral, Q12H  pantoprazole, 40 mg, Oral, Q AM      Continuous Infusions:sodium chloride, 75 mL/hr      PRN Meds:.•  albuterol  •  HYDROcodone-acetaminophen  •  Morphine  •  ondansetron    Allergies as of 03/10/2023   • (No Known Allergies)       Social History     Socioeconomic History   • Marital status:    Tobacco Use   • Smoking status: Never     Passive exposure: Never   • Smokeless tobacco: Never   Vaping Use   • Vaping Use: Never used   Substance and Sexual Activity   • Alcohol use: Never   • Drug use: Never   • Sexual activity: Defer       Family History   Problem Relation Age of Onset   • No Known Problems Mother    • No Known Problems Father        Review of Systems   Unable to perform ROS: patient nonverbal            Objective:   Temp:  [97.7 °F (36.5 °C)-98.2 °F (36.8 °C)] 97.7 °F (36.5 °C)  Heart Rate:  [61-74] 72  Resp:  [16] 16  BP: (121-174)/(64-97) 163/81  Body mass index is 29.63 kg/m².  Flowsheet Rows    Flowsheet Row First Filed  "Value   Admission Height 157.5 cm (62\") Documented at 03/10/2023 1149   Admission Weight 73.5 kg (162 lb) Documented at 03/10/2023 1149        Vitals:    03/11/23 0726   BP: 163/81   Pulse: 72   Resp: 16   Temp: 97.7 °F (36.5 °C)   SpO2: 96%       General Appearance:    In mild to moderate distress, sleeping, does not respond to my presence   Head:    Normocephalic,  atraumatic       Ears:    Ears appear intact with no abnormalities noted   Throat:   No oral lesions, no thrush, oral mucosa moist   Neck:   trachea midline,        Lungs:     respirations regular, even and unlabored    Heart:    Regular rhythm and normal rate no murmur,    Chest Wall:    Bruising, discomfort to touch   Abdomen:    non-distended, no guarding, no rebound  tenderness           Skin:  Psychiatric:   No bleeding, bruising or rash    Does not respond to light stimuli                 Lab Review:      Results from last 7 days   Lab Units 03/11/23  1109 03/11/23  0429 03/10/23  1152   SODIUM mmol/L  --  126* 142   POTASSIUM mmol/L 4.5 5.0  5.0 2.9*   CHLORIDE mmol/L  --  97* 116*   CO2 mmol/L  --  18.2* 19.0*   BUN mg/dL  --  16 10   CREATININE mg/dL  --  0.83 0.54*   CALCIUM mg/dL  --  8.4* 5.4*   BILIRUBIN mg/dL  --   --  0.2   ALK PHOS U/L  --   --  88   ALT (SGPT) U/L  --   --  11   AST (SGOT) U/L  --   --  15   GLUCOSE mg/dL  --  124* 92     Results from last 7 days   Lab Units 03/11/23  1109 03/10/23  1336 03/10/23  1152   CK TOTAL U/L  --   --  51   HSTROP T ng/L 178* 29* 16*     @[email or web address]@  Results from last 7 days   Lab Units 03/11/23  0429 03/10/23  1152   WBC 10*3/mm3 7.25 5.13   HEMOGLOBIN g/dL 12.7 12.3   HEMATOCRIT % 38.6 36.5   PLATELETS 10*3/mm3 229 244         Results from last 7 days   Lab Units 03/11/23  1109   MAGNESIUM mg/dL 2.2         I personally viewed and interpreted the patient's EKG/Telemetry data        Patient Active Problem List   Diagnosis   • Ear ache   • Fever   • Chest congestion   • Atrial fibrillation " with slow ventricular response (HCC)   • Acid reflux   • Elevated cholesterol   • Seizures (HCC)   • Acute CVA (cerebrovascular accident) (HCC)   • Syncope and collapse     Assessment and Plan:    I think syncope due to orthostatic hypotension, vagal syncope, or possible tachy nirav syndrome with conversion pause are most likely reason for LOC.      I think this is more likely that cardiac arrest due to arrhythmia or PEA given history.     I don't feel any intervention would be appropriate at this time given situation and discussion regarding palliative care.     If situation changes, please call.     Dayne Honeycutt MD  03/11/23  11:57 EST.  Time spent in reviewing chart, discussion and examination:

## 2023-03-11 NOTE — PLAN OF CARE
Goal Outcome Evaluation:   VSS, 2L O2, sinus on tele.Pt is baseline aphasic & due to the amount of pain the pt is in, it was difficult to get any words spoken from pt. PRN morphine and Norco for the pain. Pt took pill crushed in applesauce. Tolerated oral fluids. IVF infusing. K+ replacement finished, awaiting recheck labs. Son at bedside. Pt has not voided so far this shift. Bladder scanned and each scan has been less than 350ml. [place]wick and brief on. Pt has remained in a sitting up position for comfort. Bed alarm on.

## 2023-03-12 NOTE — PLAN OF CARE
Goal Outcome Evaluation:  Plan of Care Reviewed With: patient, family        Progress: declining  Outcome Evaluation: Patient transferred to [facility] for palliative care. Patient given several doses of 0.5mg IV Dilaudid in order to make her more comfortable and several doses plus administration of Scopolamine patch in order to control respiratory secretions. Patient repositioned approximately every 4 hours. Plan of care reviewed with patient's daughter at bedside, all questions answered. Patient responsive to pain however unable to communicate needs, patient has aphasia at baseline. Family remained with patient overnight, will continue comfort measures.

## 2023-03-12 NOTE — DISCHARGE SUMMARY
Discharge summary    Date of admission 3/10/2023  Date of death 3/12/2023    Discussion  83 white female with very complex past medical history who was a nursing home resident brought to the emergency room after syncopal episode followed by cardiopulmonary arrest s/p code 300 and work-up in ER revealed electrolyte imbalance elevated troponin multiple rib fractures admit to the hospital and provided with supportive care and followed by cardiology and after reevaluating in the morning by me in presence of family and addressing all the questions with her diagnosis prognosis and possible outcome they decided to withdraw the care and transfer patient to palliative care unit for comfort care only and allow natural death.  Patient remained comfortable and  this morning and her body will be released with family.    Cause of death cardiopulmonary failure    NELA MCCOY MD

## 2023-03-12 NOTE — PROGRESS NOTES
"Daily progress note    Primary care physician   Not known    Chief complaint  Comfortable with no new issues and family at bedside.    History of present illness   83-year-old white female with history of hypertension hyperlipidemia seizure disorder CVA and atrial fibrillation on Eliquis brought to the emergency room at [facility] when she had a syncopal episode at subacute rehab followed by cardiopulmonary arrest and CPR performed.  In route to nursing home patient found to be hypotensive bradycardic.  Patient has been aphasic from prior stroke and patient evaluated in ER admitted and this morning patient son and daughter-in-law after prolonged discussion with me decided to pursue comfort care only and transferred to palliative care unit and allow natural death.  Patient unable to give history most of the history obtained from the daughter-in-law son nursing staff and reviewing the old chart.     REVIEW OF SYSTEMS  All systems reviewed and negative except for those discussed in HPI.     PHYSICAL EXAM  Blood pressure 128/66, pulse 111, temperature 97.7 °F (36.5 °C), temperature source Oral, resp. rate 16, height 157.5 cm (62\"), weight 73.5 kg (162 lb), SpO2 (!) 89 %.    Unchanged    LAB RESULTS  Lab Results (last 24 hours)     ** No results found for the last 24 hours. **        Imaging Results (Last 24 Hours)     ** No results found for the last 24 hours. **           ECG 12 Lead               Component  Ref Range & Units 10:59  (3/11/23) 1 d ago  (3/10/23) 1 d ago  (3/10/23) 2 wk ago  (2/24/23) 2 wk ago  (2/22/23) 2 wk ago  (2/22/23) 2 wk ago  (2/22/23)   QT Interval  ms 387 P  394  399  296  277  454  483 P    Resulting Agency BH ECG BH ECG BH ECG BH ECG BH ECG BH ECG BH ECG             HEART RATE= 75  bpm  RR Interval= 800  ms  MI Interval= 199  ms  P Horizontal Axis= -1  deg  P Front Axis= -6  deg  QRSD Interval= 92  ms  QT Interval= 387  ms  QRS Axis= 7  deg  T Wave Axis= 4  deg  - NORMAL ECG " -  Sinus rhythm             Current Facility-Administered Medications:   •  acetaminophen (TYLENOL) tablet 650 mg, 650 mg, Oral, Q4H PRN **OR** acetaminophen (TYLENOL) 160 MG/5ML solution 650 mg, 650 mg, Oral, Q4H PRN **OR** acetaminophen (TYLENOL) suppository 650 mg, 650 mg, Rectal, Q4H PRN, Mauro Brown MD  •  diphenhydrAMINE (BENADRYL) capsule 25 mg, 25 mg, Oral, Q6H PRN **OR** diphenhydrAMINE (BENADRYL) injection 25 mg, 25 mg, Intravenous, Q6H PRN, Mauro Brown MD  •  diphenoxylate-atropine (LOMOTIL) 2.5-0.025 MG per tablet 1 tablet, 1 tablet, Oral, Q2H PRN, Mauro Brown MD  •  First Mouthwash (Magic Mouthwash) 5 mL, 5 mL, Swish & Spit, Q4H PRN, Mauro Brown MD  •  Glycerin-Hypromellose- (ARTIFICIAL TEARS) 0.2-0.2-1 % ophthalmic solution solution 1 drop, 1 drop, Both Eyes, Q30 Min PRN, Mauro Brown MD  •  glycopyrrolate (ROBINUL) injection 0.2 mg, 0.2 mg, Intravenous, Q2H PRN, 0.2 mg at 03/11/23 2050 **OR** glycopyrrolate (ROBINUL) injection 0.2 mg, 0.2 mg, Subcutaneous, Q2H PRN **OR** glycopyrrolate (ROBINUL) injection 0.4 mg, 0.4 mg, Intravenous, Q2H PRN, 0.4 mg at 03/12/23 1133 **OR** glycopyrrolate (ROBINUL) injection 0.4 mg, 0.4 mg, Subcutaneous, Q2H PRN, Mauro Brown MD  •  haloperidol (HALDOL) tablet 1 mg, 1 mg, Oral, Q4H PRN **OR** haloperidol (HALDOL) 2 MG/ML solution 1 mg, 1 mg, Oral, Q4H PRN **OR** haloperidol lactate (HALDOL) injection 1 mg, 1 mg, Subcutaneous, Q4H PRN, Mauro Brown MD  •  HYDROmorphone (DILAUDID) injection 0.5 mg, 0.5 mg, Intravenous, Q1H PRN, 0.5 mg at 03/12/23 0528 **OR** morphine injection 2 mg, 2 mg, Intravenous, Q1H PRN, 2 mg at 03/11/23 1643 **OR** morphine concentrated solution 5 mg, 5 mg, Sublingual, Q1H PRN **OR** ketorolac (TORADOL) injection 15 mg, 15 mg, Intravenous, Q6H PRN, Mauro Brown MD  •  HYDROmorphone (DILAUDID) injection 1 mg, 1 mg, Intravenous, Q1H PRN, 1 mg at 03/12/23 1133 **OR** Morphine sulfate (PF) injection 4 mg, 4 mg, Intravenous, Q1H PRN  **OR** morphine concentrated solution 10 mg, 10 mg, Sublingual, Q1H PRN, Nela Brown MD  •  HYDROmorphone (DILAUDID) injection 1.5 mg, 1.5 mg, Intravenous, Q1H PRN, 1.5 mg at 03/12/23 1310 **OR** Morphine injection 6 mg, 6 mg, Intravenous, Q1H PRN, Nela Brown MD  •  LORazepam (ATIVAN) injection 0.5 mg, 0.5 mg, Intravenous, Q1H PRN **OR** LORazepam (ATIVAN) injection 0.5 mg, 0.5 mg, Subcutaneous, Q1H PRN **OR** LORazepam (ATIVAN) 2 MG/ML concentrated solution 0.5 mg, 0.5 mg, Sublingual, Q1H PRN, Nela Brown MD  •  LORazepam (ATIVAN) injection 1 mg, 1 mg, Intravenous, Q1H PRN, 1 mg at 03/12/23 0036 **OR** LORazepam (ATIVAN) injection 1 mg, 1 mg, Subcutaneous, Q1H PRN **OR** LORazepam (ATIVAN) 2 MG/ML concentrated solution 1 mg, 1 mg, Sublingual, Q1H PRN, Nela Brown MD  •  LORazepam (ATIVAN) injection 2 mg, 2 mg, Intravenous, Q1H PRN, 2 mg at 03/12/23 1310 **OR** LORazepam (ATIVAN) injection 2 mg, 2 mg, Subcutaneous, Q1H PRN **OR** LORazepam (ATIVAN) 2 MG/ML concentrated solution 2 mg, 2 mg, Sublingual, Q1H PRN, Nela Brown MD  •  promethazine (PHENERGAN) tablet 6.25 mg, 6.25 mg, Oral, Q4H PRN **OR** promethazine (PHENERGAN) 6.25 MG/5ML syrup 6.25 mg, 6.25 mg, Oral, Q4H PRN **OR** promethazine (PHENERGAN) suppository 6.25 mg, 6.25 mg, Rectal, Q4H PRN, Nela Brown MD  •  scopolamine patch 1 mg/72 hr, 1 patch, Transdermal, Q72H PRN, Nela Brown MD, 1 patch at 03/11/23 6602    ASSESSMENT  Status postcardiopulmonary arrest   Multiple rib fractures  Syncopal episode  Electrolyte imbalance  Elevated troponin  Paroxysmal atrial fibrillation  Hypertension  Hyperlipidemia  Seizure disorder  History of CVA with aphasia  Gastroesophageal reflux disease    PLAN  Comfort care only  Allow natural death  Routine palliative care orders  Discussed with family  Discussed with nursing staff    NELA BROWN MD

## 2023-03-12 NOTE — NURSING NOTE
Contacted attending MD due to patient having uncontrolled pain given with IV Dilaudid 0.5mg. Orders received for additional medications from palliative care order set, see orders.

## 2023-03-12 NOTE — PAYOR COMM NOTE
"[facility]   [facility]  [facility]  [address]    [address] [place] [address]    [place] [address]    Raul Garcia - [phone]  Utilization Review/Room Reservations  Phone: [phone], [phone], [phone] or [phone]  Fax: [phone]  Email: [email or web address]  Please call, fax back, or email with authorization or any questions! Thanks!    REQUESTED CLINICAL  AUTH#[id number]          This fax contains any of the following:  Face Sheet, H&P, progress notes, consults, orders, meds, lab results, labor record, vitals, delivery worksheet, op note, d/c summary.  The information contained in this fax is confidential for the use of the Individual or entity named above. If the reader of this message is not the Intended recipient (or the employee or agent responsible to deliver it to the Intended recipient), you are hereby notified that any dissemination, distribution, or copy of this communication is prohibited. If you have received this communication in error, please notify us by collect telephone call and return the original message to us at the above address at our expense.  Ina Bryant (83 y.o. Female)     Date of Birth   1940    Social Security Number       Address   [address] [place] [id number]    Home Phone   [phone]    MRN   [id number]       Hindu   Sabianist    Marital Status                               Admission Date   3/10/23    Admission Type   Emergency    Admitting Provider   Mauro Brown MD    Attending Provider   Mauro Brown MD    Department, Room/Bed   [facility] [address] [place], P477/1       Discharge Date       Discharge Disposition       Discharge Destination                               Attending Provider: Mauro Brown MD    Allergies: No Known Allergies    Isolation: None   Infection: None   Code Status: No CPR    Ht: 157.5 cm (62\")   Wt: 73.5 kg (162 " lb)    Admission Cmt: None   Principal Problem: Syncope and collapse [R55]                 Active Insurance as of 3/10/2023     Primary Coverage     Payor Plan Insurance Group Employer/Plan Group    ANTHEM MEDICARE REPLACEMENT ANTHEM MEDICARE ADVANTAGE [id number]     Payor Plan Address Payor Plan Phone Number Payor Plan Fax Number Effective Dates    [address] [phone]  1/1/2022 - None Entered    [facility] [id number]       Subscriber Name Subscriber Birth Date Member ID       KATIA CORONADO 1940 [id number]                 Emergency Contacts      (Rel.) Home Phone Work Phone Mobile Phone    IVONNE (POA)RUTHIE (Son) [phone] -- --    ALVINO CORONADO (Other) [phone] -- --               History & Physical      Mauro Brown MD at 03/11/23 0940          History and physical    Primary care physician   Not known    Chief complaint  Cardiac arrest    History of present illness   83-year-old white female with history of hypertension hyperlipidemia seizure disorder CVA and atrial fibrillation on Eliquis brought to the emergency room at [facility] when she had a syncopal episode at subacute rehab followed by cardiopulmonary arrest and CPR performed.  In route to nursing home patient found to be hypotensive bradycardic.  Patient has been aphasic from prior stroke and patient evaluated in ER admitted and this morning patient son and daughter-in-law after prolonged discussion with me decided to pursue comfort care only and transferred to palliative care unit and allow natural death.  Patient unable to give history most of the history obtained from the daughter-in-law son nursing staff and reviewing the old chart.    PAST MEDICAL HISTORY            Diagnosis Date Noted   • Ear ache 04/03/2017   • Fever 04/03/2017   • Chest congestion 04/03/2017   • Atrial fibrillation  02/22/2023   • Acid reflux     • Elevated cholesterol     • Seizures  02/22/2023   • Cerebrovascular accident 02/22/2023  "      PAST SURGICAL HISTORY              Procedure Laterality Date   • ADENOIDECTOMY       • CHOLECYSTECTOMY       • THYROID SURGERY       • TONSILLECTOMY             FAMILY HISTORY           Problem Relation Age of Onset   • No Known Problems Mother     • No Known Problems Father       SOCIAL HISTORY                 Socioeconomic History   • Marital status:    Tobacco Use   • Smoking status: Never       Passive exposure: Never   • Smokeless tobacco: Never   Vaping Use   • Vaping Use: Never used   Substance and Sexual Activity   • Alcohol use: Never   • Drug use: Never   • Sexual activity: Defer         ALLERGIES  Patient has no known allergies.  Home medications reviewed     REVIEW OF SYSTEMS  All systems reviewed and negative except for those discussed in HPI.     PHYSICAL EXAM  Blood pressure 157/74, pulse 70, temperature 97.5 °F (36.4 °C), temperature source Oral, resp. rate 16, height 157.5 cm (62\"), weight 73.5 kg (162 lb), SpO2 96 %.    GENERAL:  Awake in mild distress  HENT: NCAT: nares patent: Neck supple: No C-spine tenderness  EYES: no scleral icterus: Pale conjunctiva  CV: regular rhythm, normal rate   RESPIRATORY:  Moving air bilaterally but decreased at bases  ABDOMEN: soft, NTND: Bowel sounds positive  MUSCULOSKELETAL: no deformity  NEURO: Awake but aphasic nonfocal exam  PSYCH: Appears anxious  SKIN: Pale    LAB RESULTS  Lab Results (last 24 hours)     Procedure Component Value Units Date/Time    High Sensitivity Troponin T [[id number]]  (Abnormal) Collected: 03/11/23 1109    Specimen: Blood Updated: 03/11/23 1151     HS Troponin T 178 ng/L     Narrative:      High Sensitive Troponin T Reference Range:  <10.0 ng/L- Negative Female for AMI  <15.0 ng/L- Negative Male for AMI  >=10 - Abnormal Female indicating possible myocardial injury.  >=15 - Abnormal Male indicating possible myocardial injury.   Clinicians would have to utilize clinical acumen, EKG, Troponin, and serial changes to determine if " it is an Acute Myocardial Infarction or myocardial injury due to an underlying chronic condition.         T4, Free [[id number]]  (Normal) Collected: 03/11/23 1109    Specimen: Blood Updated: 03/11/23 1148     Free T4 1.49 ng/dL     Narrative:      Results may be falsely increased if patient taking Biotin.      Magnesium [[id number]]  (Normal) Collected: 03/11/23 1109    Specimen: Blood Updated: 03/11/23 1138     Magnesium 2.2 mg/dL     Potassium [[id number]]  (Normal) Collected: 03/11/23 1109    Specimen: Blood Updated: 03/11/23 1135     Potassium 4.5 mmol/L     Basic Metabolic Panel [[id number]]  (Abnormal) Collected: 03/11/23 0429    Specimen: Blood Updated: 03/11/23 0515     Glucose 124 mg/dL      BUN 16 mg/dL      Creatinine 0.83 mg/dL      Sodium 126 mmol/L      Potassium 5.0 mmol/L      Comment: Slight hemolysis detected by analyzer. Results may be affected.        Chloride 97 mmol/L      CO2 18.2 mmol/L      Calcium 8.4 mg/dL      BUN/Creatinine Ratio 19.3     Anion Gap 10.8 mmol/L      eGFR 70.0 mL/min/1.73     Narrative:      GFR Normal >60  Chronic Kidney Disease <60  Kidney Failure <15    The GFR formula is only valid for adults with stable renal function between ages 18 and 70.    Potassium [[id number]]  (Normal) Collected: 03/11/23 0429    Specimen: Blood Updated: 03/11/23 0513     Potassium 5.0 mmol/L      Comment: Slight hemolysis detected by analyzer. Results may be affected.       CBC & Differential [[id number]]  (Abnormal) Collected: 03/11/23 0429    Specimen: Blood Updated: 03/11/23 0448    Narrative:      The following orders were created for panel order CBC & Differential.  Procedure                               Abnormality         Status                     ---------                               -----------         ------                     CBC Auto Differential[[id number]]        Abnormal            Final result                 Please view results for these tests on the individual orders.    CBC  Auto Differential [[id number]]  (Abnormal) Collected: 03/11/23 0429    Specimen: Blood Updated: 03/11/23 0448     WBC 7.25 10*3/mm3      RBC 4.08 10*6/mm3      Hemoglobin 12.7 g/dL      Hematocrit 38.6 %      MCV 94.6 fL      MCH 31.1 pg      MCHC 32.9 g/dL      RDW 13.2 %      RDW-SD 46.0 fl      MPV 9.9 fL      Platelets 229 10*3/mm3      Neutrophil % 66.9 %      Lymphocyte % 20.3 %      Monocyte % 11.9 %      Eosinophil % 0.0 %      Basophil % 0.3 %      Immature Grans % 0.6 %      Neutrophils, Absolute 4.86 10*3/mm3      Lymphocytes, Absolute 1.47 10*3/mm3      Monocytes, Absolute 0.86 10*3/mm3      Eosinophils, Absolute 0.00 10*3/mm3      Basophils, Absolute 0.02 10*3/mm3      Immature Grans, Absolute 0.04 10*3/mm3      nRBC 0.0 /100 WBC     Urinalysis With Culture If Indicated - Straight Cath [[id number]]  (Abnormal) Collected: 03/10/23 1430    Specimen: Urine from Straight Cath Updated: 03/10/23 1452     Color, UA Yellow     Appearance, UA Clear     pH, UA 6.0     Specific Gravity, UA 1.021     Glucose, UA Negative     Ketones, UA Negative     Bilirubin, UA Negative     Blood, UA Small (1+)     Protein, UA 30 mg/dL (1+)     Leuk Esterase, UA Negative     Nitrite, UA Negative     Urobilinogen, UA 1.0 E.U./dL    Narrative:      In absence of clinical symptoms, the presence of pyuria, bacteria, and/or nitrites on the urinalysis result does not correlate with infection.    Urinalysis, Microscopic Only - Straight Cath [[id number]]  (Abnormal) Collected: 03/10/23 1430    Specimen: Urine from Straight Cath Updated: 03/10/23 1452     RBC, UA 3-5 /HPF      WBC, UA 0-2 /HPF      Comment: Urine culture not indicated.        Bacteria, UA Trace /HPF      Squamous Epithelial Cells, UA 0-2 /HPF      Hyaline Casts, UA 3-6 /LPF      Methodology Manual Light Microscopy    Calcium, Ionized [[id number]]  (Normal) Collected: 03/10/23 1336    Specimen: Blood Updated: 03/10/23 1409     Ionized Calcium 1.22 mmol/L      Ionized Calcium  4.9 mg/dL     High Sensitivity Troponin T 2Hr [[id number]]  (Abnormal) Collected: 03/10/23 1336    Specimen: Blood Updated: 03/10/23 1409     HS Troponin T 29 ng/L      Troponin T Delta 13 ng/L     Narrative:      High Sensitive Troponin T Reference Range:  <10.0 ng/L- Negative Female for AMI  <15.0 ng/L- Negative Male for AMI  >=10 - Abnormal Female indicating possible myocardial injury.  >=15 - Abnormal Male indicating possible myocardial injury.   Clinicians would have to utilize clinical acumen, EKG, Troponin, and serial changes to determine if it is an Acute Myocardial Infarction or myocardial injury due to an underlying chronic condition.             Imaging Results (Last 24 Hours)     Procedure Component Value Units Date/Time    XR Chest 1 View [[id number]] Collected: 03/10/23 1303     Updated: 03/10/23 2015    Narrative:      PORTABLE CHEST     HISTORY: Shortness of breath.     COMPARISON: None.     A single view of the chest demonstrates a defibrillator paddle overlying  the left heart border. The heart is within normal limits in size. There  is minimal cephalization of the pulmonary vasculature. There is no  evidence of consolidation, focal infiltrate or effusion.     This report was finalized on 3/10/2023 8:12 PM by Dr. Jack Nielson M.D.       CT Head Without Contrast [[id number]] Collected: 03/10/23 1342     Updated: 03/10/23 1636    Narrative:      CT HEAD WITHOUT CONTRAST     CLINICAL HISTORY: The patient has a history of a recent stroke.  Currently complains of syncope.     TECHNIQUE: CT scan of the head was obtained with 3 mm axial soft tissue  algorithm images. No intravenous contrast was administered. Sagittal and  coronal reconstructions were obtained.     COMPARISON: CT head and MRI brain dated 02/22/2023.     FINDINGS:       There is an abnormal hypodensity within the left caudate head, anterior  limb of the left internal capsule, and lentiform nucleus that is  compatible with a late subacute to  chronic infarct within  lenticulostriate distribution. Late subacute blood degradation products  were seen at this site on the previous brain MRI dated 02/22/2023. This  area measures up to approximately 2.4 x 1.7 cm in greatest axial  dimensions and demonstrates no significant interval change since the  prior study dated 02/22/2023. A small chronic infarct within the right  supramarginal gyrus within the right MCA distribution measuring up to  1.6 x 0.6 cm in greatest axial dimensions is again noted. Again noted is  a small chronic infarct within the superior aspect of the left  cerebellar hemisphere within the left cerebellar artery distribution.  This measures up to approximately 5 mm in diameter.     The ventricles, sulci, and cisterns are age appropriate. Mild changes of  chronic small vessel ischemic phenomena are appreciated The basal  ganglia and thalami are unremarkable in appearance. Atherosclerotic  calcifications are appreciated within the intracranial vasculature.      Incidental note is made of findings of a prior right mastoidectomy.       Impression:         There is no CT evidence to suggest acute intracranial pathology. Again  noted is a late subacute to chronic infarct within lenticulostriate  distribution involving the left caudate head, the anterior limb of the  left internal capsule, and the anterior aspect of the left lentiform  nucleus. No significant interval change is seen when compared to the  prior study. Additionally, there are findings compatible with a chronic  infarct within the lateral aspect of the right inferior parietal lobule  within the right MCA distribution. A subcentimeter chronic infarct is  seen within the left cerebellar hemisphere within the left superior  cerebellar artery distribution.     Radiation dose reduction techniques were utilized, including automated  exposure control and exposure modulation based on body size.     This report was finalized on 3/10/2023 4:33 PM  by Dr. Richard Borges M.D.       CT Chest Without Contrast Diagnostic [[id number]] Collected: 03/10/23 1339     Updated: 03/10/23 1429    Narrative:      CT SCAN OF THE CHEST WITHOUT CONTRAST     HISTORY: Chest pain and shortness of breath. Recent syncopal episode  followed by seizure and cardiopulmonary arrest with resuscitation.     The CT scan was performed as an emergency procedure through the chest  without contrast. The following findings are present:  1. There is a slightly displaced fracture involving the distal-third of  the sternum with very small substernal hematoma that measures 1 cm in AP  diameter, 1.5 cm in transverse diameter, and extends along the posterior  margin of the sternum for a distance of 4.8 cm. There are also  nondisplaced fractures involving the anterior aspects of the left and  right 2nd through 5th ribs. There is no compression fracture in the  thoracic spine.  2. The lungs are well-expanded and clear except for some minimal  dependent atelectasis at the lung bases. There is no mediastinal or  hilar or axillary adenopathy. There is no pericardial effusion.  3. There is mild ectasia of the thoracic aorta with the ascending aorta  measuring 3.6 cm and the descending thoracic aorta generally measuring  2.9 cm. There is coronary artery calcification present.  4. The CT images through the upper liver, spleen, both adrenal glands,  and upper poles of both kidneys are unremarkable.  5. There are several right thyroid nodules measuring up to 1.4 cm. This  represents a low density nodule that may be cystic. The left thyroid  lobe is absent. This is unchanged from the recent CT angiography of the  neck dated 02/22/2023. Further evaluation with thyroid ultrasound may be  helpful on an elective basis.                 Radiation dose reduction techniques were utilized, including automated  exposure control and exposure modulation based on body size.     This report was finalized on 3/10/2023 2:26 PM  by Dr. Rudi Victor M.D.              ECG 12 Lead               Component  Ref Range & Units 10:59  (3/11/23) 1 d ago  (3/10/23) 1 d ago  (3/10/23) 2 wk ago  (2/24/23) 2 wk ago  (2/22/23) 2 wk ago  (2/22/23) 2 wk ago  (2/22/23)   QT Interval  ms 387 P  394  399  296  277  454  483 P    Resulting Agency BH ECG BH ECG BH ECG BH ECG BH ECG BH ECG BH ECG             HEART RATE= 75  bpm  RR Interval= 800  ms  OH Interval= 199  ms  P Horizontal Axis= -1  deg  P Front Axis= -6  deg  QRSD Interval= 92  ms  QT Interval= 387  ms  QRS Axis= 7  deg  T Wave Axis= 4  deg  - NORMAL ECG -  Sinus rhythm             Current Facility-Administered Medications:   •  albuterol (PROVENTIL) nebulizer solution 0.083% 2.5 mg/3mL, 2.5 mg, Nebulization, Q4H PRN, Mauro Brown MD  •  apixaban (ELIQUIS) tablet 5 mg, 5 mg, Oral, Q12H, Mauro Brown MD  •  atorvastatin (LIPITOR) tablet 40 mg, 40 mg, Oral, Nightly, Mauro Brown MD  •  HYDROcodone-acetaminophen (NORCO) 5-325 MG per tablet 1 tablet, 1 tablet, Oral, Q4H PRN, Mauro Brown MD, 1 tablet at 03/11/23 1302  •  lacosamide (VIMPAT) tablet 100 mg, 100 mg, Oral, BID, Mauro Brown MD  •  metoprolol tartrate (LOPRESSOR) tablet 12.5 mg, 12.5 mg, Oral, Q12H, Mauro Brown MD  •  morphine injection 2 mg, 2 mg, Intravenous, Q4H PRN, aMuro Brown MD, 2 mg at 03/11/23 0943  •  ondansetron (ZOFRAN) injection 4 mg, 4 mg, Intravenous, Q6H PRN, Mauro Brown MD  •  pantoprazole (PROTONIX) EC tablet 40 mg, 40 mg, Oral, Q AM, Mauro Brown MD  •  sodium chloride 0.9 % infusion, 75 mL/hr, Intravenous, Continuous, Mauro Brown MD    ASSESSMENT  Status postcardiopulmonary arrest s/p code 300  Multiple rib fractures  Syncopal episode  Electrolyte imbalance  Elevated troponin  Paroxysmal atrial fibrillation  Hypertension  Hyperlipidemia  Seizure disorder  History of CVA with aphasia  Gastroesophageal reflux disease    PLAN  Patient admitted and provided with supportive care and cardiology consult obtained  and this morning after long discussion with patient diagnosis prognosis and possible outcome patient POA decided to withdraw the care and transfer patient to palliative care unit for comfort care only and allow natural death and stop all scheduled medication and use routine palliative care orders.  I agree with patient's son and daughter-in-law and will comply with their wishes.    NELA BROWN MD    Electronically signed by Nela Brown MD at 03/11/23 1401          Emergency Department Notes      Leia Gonzalez RN at 03/10/23 1144        Pt presents from [facility] via EMS after a witnessed syncopal episode with reported seizure activity and cardiac arrest. CPR was initiated by staff, upon arrival by EMS noted pt did have a pulse. EKG obtained, Afib with HR in the 30s. Atropine 1mg administered, HR improved to the 70s. 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 FSBS 146.    Electronically signed by Leia Gonzalez RN at 03/10/23 1148     Jono Buck MD at 03/10/23 1146      Procedure Orders    1. Critical Care [[id number]] ordered by Jono Buck MD                EMERGENCY DEPARTMENT ENCOUNTER    Room Number:  16/16  Date seen:  3/10/2023  PCP: Provider, No Known  Historian: EMS      HPI:  Chief Complaint: Cardiac arrest  A complete HPI/ROS/PMH/PSH/SH/FH are unobtainable due to: Aphasia  Context: Ina Bryant is a 83 y.o. female who presents to the ED via EMS from a rehab center.  Per EMS the patient was at rehab and had a syncopal episode and then witnessed seizure.  Nursing home states she then went into full cardiopulmonary arrest and performed CPR for approximately 2 minutes with ROSC.  EMS states that on their arrival the patient was lying on her left side but had a pulse.  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  In route EMS states that the patient was bradycardic in the 30s and hypotensive in the 80s and gave her a dose of atropine with  improvement of her heart rate of 60 and improvement of her blood pressure to 90.      PAST MEDICAL HISTORY  Active Ambulatory Problems     Diagnosis Date Noted   • Ear ache 04/03/2017   • Fever 04/03/2017   • Chest congestion 04/03/2017   • Atrial fibrillation with slow ventricular response ([facility]) 02/22/2023   • Acid reflux    • Elevated cholesterol    • Seizures ([facility]) 02/22/2023   • Acute CVA (cerebrovascular accident) ([facility]) 02/22/2023     Resolved Ambulatory Problems     Diagnosis Date Noted   • Urinary tract infection without hematuria 02/22/2023   • Acute nonintractable headache 02/22/2023     Past Medical History:   Diagnosis Date   • Gallbladder abscess          REVIEW OF SYSTEMS  All systems reviewed and negative except for those discussed in HPI.       PAST SURGICAL HISTORY  Past Surgical History:   Procedure Laterality Date   • ADENOIDECTOMY     • CHOLECYSTECTOMY     • THYROID SURGERY     • TONSILLECTOMY           FAMILY HISTORY  Family History   Problem Relation Age of Onset   • No Known Problems Mother    • No Known Problems Father          SOCIAL HISTORY  Social History     Socioeconomic History   • Marital status:    Tobacco Use   • Smoking status: Never     Passive exposure: Never   • Smokeless tobacco: Never   Vaping Use   • Vaping Use: Never used   Substance and Sexual Activity   • Alcohol use: Never   • Drug use: Never   • Sexual activity: Defer         ALLERGIES  Patient has no known allergies.      PHYSICAL EXAM  ED Triage Vitals   Temp Pulse Resp BP SpO2   -- -- -- -- --      Temp src Heart Rate Source Patient Position BP Location FiO2 (%)   -- -- -- -- --       Physical Exam      GENERAL: 83-year-old female in mild distress  HENT: NCAT: nares patent: Neck supple: No C-spine tenderness  EYES: no scleral icterus: Pale conjunctiva  CV: regular rhythm, normal rate in the 60s: Patient does have significant tenderness to palpation of her chest after her CPR  RESPIRATORY: The patient has some  grunting with respiration that I believe is secondary to her pain from likely rib fractures due to CPR  ABDOMEN: soft, NTND: Bowel sounds positive  MUSCULOSKELETAL: no deformity  NEURO: Awake but aphasic nonfocal exam  PSYCH: Appears anxious  SKIN: Pale    Vital signs and nursing notes reviewed.    PPE pt does not present with symptoms for COVID19; however, I was wearing a mask and goggles throughout all patient interaction.    LAB RESULTS  Recent Results (from the past 24 hour(s))   ECG 12 Lead Syncope    Collection Time: 03/10/23 11:50 AM   Result Value Ref Range    QT Interval 399 ms   Comprehensive Metabolic Panel    Collection Time: 03/10/23 11:52 AM    Specimen: Blood   Result Value Ref Range    Glucose 92 65 - 99 mg/dL    BUN 10 8 - 23 mg/dL    Creatinine 0.54 (L) 0.57 - 1.00 mg/dL    Sodium 142 136 - 145 mmol/L    Potassium 2.9 (L) 3.5 - 5.2 mmol/L    Chloride 116 (H) 98 - 107 mmol/L    CO2 19.0 (L) 22.0 - 29.0 mmol/L    Calcium 5.4 (C) 8.6 - 10.5 mg/dL    Total Protein 4.3 (L) 6.0 - 8.5 g/dL    Albumin 2.3 (L) 3.5 - 5.2 g/dL    ALT (SGPT) 11 1 - 33 U/L    AST (SGOT) 15 1 - 32 U/L    Alkaline Phosphatase 88 39 - 117 U/L    Total Bilirubin 0.2 0.0 - 1.2 mg/dL    Globulin 2.0 gm/dL    A/G Ratio 1.2 g/dL    BUN/Creatinine Ratio 18.5 7.0 - 25.0    Anion Gap 7.0 5.0 - 15.0 mmol/L    eGFR 91.5 >60.0 mL/min/1.73   BNP    Collection Time: 03/10/23 11:52 AM    Specimen: Blood   Result Value Ref Range    proBNP 80.0 0.0 - 1,800.0 pg/mL   High Sensitivity Troponin T    Collection Time: 03/10/23 11:52 AM    Specimen: Blood   Result Value Ref Range    HS Troponin T 16 (H) <10 ng/L   Magnesium    Collection Time: 03/10/23 11:52 AM    Specimen: Blood   Result Value Ref Range    Magnesium 1.5 (L) 1.6 - 2.4 mg/dL   TSH    Collection Time: 03/10/23 11:52 AM    Specimen: Blood   Result Value Ref Range    TSH 0.700 0.270 - 4.200 uIU/mL   T4, Free    Collection Time: 03/10/23 11:52 AM    Specimen: Blood   Result Value Ref Range     Free T4 1.28 0.93 - 1.70 ng/dL   CK    Collection Time: 03/10/23 11:52 AM    Specimen: Blood   Result Value Ref Range    Creatine Kinase 51 20 - 180 U/L   Phenytoin Level, Total    Collection Time: 03/10/23 11:52 AM    Specimen: Blood   Result Value Ref Range    Phenytoin Level <0.8 (L) 10.0 - 20.0 mcg/mL   CBC Auto Differential    Collection Time: 03/10/23 11:52 AM    Specimen: Blood   Result Value Ref Range    WBC 5.13 3.40 - 10.80 10*3/mm3    RBC 3.81 3.77 - 5.28 10*6/mm3    Hemoglobin 12.3 12.0 - 15.9 g/dL    Hematocrit 36.5 34.0 - 46.6 %    MCV 95.8 79.0 - 97.0 fL    MCH 32.3 26.6 - 33.0 pg    MCHC 33.7 31.5 - 35.7 g/dL    RDW 13.4 12.3 - 15.4 %    RDW-SD 47.2 37.0 - 54.0 fl    MPV 9.8 6.0 - 12.0 fL    Platelets 244 140 - 450 10*3/mm3    Neutrophil % 43.8 42.7 - 76.0 %    Lymphocyte % 37.6 19.6 - 45.3 %    Monocyte % 15.8 (H) 5.0 - 12.0 %    Eosinophil % 0.4 0.3 - 6.2 %    Basophil % 0.6 0.0 - 1.5 %    Immature Grans % 1.8 (H) 0.0 - 0.5 %    Neutrophils, Absolute 2.25 1.70 - 7.00 10*3/mm3    Lymphocytes, Absolute 1.93 0.70 - 3.10 10*3/mm3    Monocytes, Absolute 0.81 0.10 - 0.90 10*3/mm3    Eosinophils, Absolute 0.02 0.00 - 0.40 10*3/mm3    Basophils, Absolute 0.03 0.00 - 0.20 10*3/mm3    Immature Grans, Absolute 0.09 (H) 0.00 - 0.05 10*3/mm3    nRBC 0.0 0.0 - 0.2 /100 WBC   Blood Gas, Arterial -    Collection Time: 03/10/23 12:04 PM    Specimen: Arterial Blood   Result Value Ref Range    Site Arterial: right radial     Alonso's Test Positive     pH, Arterial 7.404 7.350 - 7.450 pH units    pCO2, Arterial 37.8 35.0 - 45.0 mm Hg    pO2, Arterial 94.0 80.0 - 100.0 mm Hg    HCO3, Arterial 23.6 22.0 - 28.0 mmol/L    Base Excess, Arterial -0.9 (L) 0.0 - 2.0 mmol/L    O2 Saturation Calculated 97.4 92.0 - 99.0 %    Barometric Pressure for Blood Gas 749.2 mmHg    Modality Cannula     Flow Rate 4 lpm    Rate 28 Breaths/minute   High Sensitivity Troponin T 2Hr    Collection Time: 03/10/23  1:36 PM    Specimen: Blood    Result Value Ref Range    HS Troponin T 29 (H) <10 ng/L    Troponin T Delta 13 (C) >=-4 - <+4 ng/L   Calcium, Ionized    Collection Time: 03/10/23  1:36 PM    Specimen: Blood   Result Value Ref Range    Ionized Calcium 1.22 1.15 - 1.35 mmol/L    Ionized Calcium 4.9 4.6 - 5.4 mg/dL   Urinalysis With Culture If Indicated - Straight Cath    Collection Time: 03/10/23  2:30 PM    Specimen: Straight Cath; Urine   Result Value Ref Range    Color, UA Yellow Yellow, Straw    Appearance, UA Clear Clear    pH, UA 6.0 5.0 - 8.0    Specific Gravity, UA 1.021 1.005 - 1.030    Glucose, UA Negative Negative    Ketones, UA Negative Negative    Bilirubin, UA Negative Negative    Blood, UA Small (1+) (A) Negative    Protein, UA 30 mg/dL (1+) (A) Negative    Leuk Esterase, UA Negative Negative    Nitrite, UA Negative Negative    Urobilinogen, UA 1.0 E.U./dL 0.2 - 1.0 E.U./dL   Urinalysis, Microscopic Only - Straight Cath    Collection Time: 03/10/23  2:30 PM    Specimen: Straight Cath; Urine   Result Value Ref Range    RBC, UA 3-5 (A) None Seen, 0-2 /HPF    WBC, UA 0-2 None Seen, 0-2 /HPF    Bacteria, UA Trace (A) None Seen /HPF    Squamous Epithelial Cells, UA 0-2 None Seen, 0-2 /HPF    Hyaline Casts, UA 3-6 None Seen /LPF    Methodology Manual Light Microscopy    ECG 12 Lead Chest Pain    Collection Time: 03/10/23  2:32 PM   Result Value Ref Range    QT Interval 394 ms       Ordered the above labs and reviewed the results.        RADIOLOGY  CT Head Without Contrast    Result Date: 3/10/2023  CT HEAD WITHOUT CONTRAST  CLINICAL HISTORY: The patient has a history of a recent stroke. Currently complains of syncope.  TECHNIQUE: CT scan of the head was obtained with 3 mm axial soft tissue algorithm images. No intravenous contrast was administered. Sagittal and coronal reconstructions were obtained.  COMPARISON: CT head and MRI brain dated 02/22/2023.  FINDINGS:   There is an abnormal hypodensity within the left caudate head, anterior  limb of the left internal capsule, and lentiform nucleus that is compatible with a late subacute to chronic infarct within lenticulostriate distribution. Late subacute blood degradation products were seen at this site on the previous brain MRI dated 02/22/2023. This area measures up to approximately 2.4 x 1.7 cm in greatest axial dimensions and demonstrates no significant interval change since the prior study dated 02/22/2023. A small chronic infarct within the right supramarginal gyrus within the right MCA distribution measuring up to 1.6 x 0.6 cm in greatest axial dimensions is again noted. Again noted is a small chronic infarct within the superior aspect of the left cerebellar hemisphere within the left cerebellar artery distribution. This measures up to approximately 5 mm in diameter.  The ventricles, sulci, and cisterns are age appropriate. Mild changes of chronic small vessel ischemic phenomena are appreciated The basal ganglia and thalami are unremarkable in appearance. Atherosclerotic calcifications are appreciated within the intracranial vasculature.  Incidental note is made of findings of a prior right mastoidectomy.       There is no CT evidence to suggest acute intracranial pathology. Again noted is a late subacute to chronic infarct within lenticulostriate distribution involving the left caudate head, the anterior limb of the left internal capsule, and the anterior aspect of the left lentiform nucleus. No significant interval change is seen when compared to the prior study. Additionally, there are findings compatible with a chronic infarct within the lateral aspect of the right inferior parietal lobule within the right MCA distribution. Subcentimeter chronic infarct is seen within the left cerebellar hemisphere within the left superior cerebellar artery distribution.  Radiation dose reduction techniques were utilized, including automated exposure control and exposure modulation based on body size.        CT Chest Without Contrast Diagnostic    Result Date: 3/10/2023  CT SCAN OF THE CHEST WITHOUT CONTRAST  HISTORY: Chest pain and shortness of breath. Recent syncopal episode followed by seizure and cardiopulmonary arrest with resuscitation.  The CT scan was performed as an emergency procedure through the chest without contrast. The following findings are present: 1. There is a slightly displaced fracture involving the distal-third of the sternum with very small substernal hematoma that measures 1 cm in AP diameter, 1.5 cm in transverse diameter, and extends along the posterior margin of the sternum for a distance of 4.8 cm. There are also nondisplaced fractures involving the anterior aspects of the left and right 2nd through 5th ribs. There is no compression fracture in the thoracic spine. 2. The lungs are well-expanded and clear except for some minimal dependent atelectasis at the lung bases. There is no mediastinal or hilar or axillary adenopathy. There is no pericardial effusion. 3. There is mild ectasia of the thoracic aorta with the ascending aorta measuring 3.6 cm and the descending thoracic aorta generally measuring 2.9 cm. There is coronary artery calcification present. 4. The CT images through the upper liver, spleen, both adrenal glands, and upper poles of both kidneys are unremarkable. 5. There are several right thyroid nodules measuring up to 1.4 cm. This represents a low density nodule that may be cystic. The left thyroid lobe is absent. This is unchanged from the recent CT angiography of the neck dated 02/22/2023. Further evaluation with thyroid ultrasound may be helpful on an elective basis.      Radiation dose reduction techniques were utilized, including automated exposure control and exposure modulation based on body size.  This report was finalized on 3/10/2023 2:26 PM by Dr. Rudi Victor M.D.      XR Chest 1 View    Result Date: 3/10/2023  PORTABLE CHEST  HISTORY: Shortness of breath.   COMPARISON: None.  A single view of the chest demonstrates a defibrillator paddle overlying the left heart border. The heart is within normal limits in size. There is minimal cephalization of the pulmonary vasculature. There is no evidence of consolidation, focal infiltrate or effusion.        Ordered the above noted radiological studies. Reviewed by me in PACS.            PROCEDURES  Critical Care  Performed by: Jono Buck MD  Authorized by: Jono Buck MD     Critical care provider statement:     Critical care time (minutes):  45    Critical care time was exclusive of:  Separately billable procedures and treating other patients    Critical care was necessary to treat or prevent imminent or life-threatening deterioration of the following conditions:  Circulatory failure and CNS failure or compromise    Critical care was time spent personally by me on the following activities:  Development of treatment plan with patient or surrogate, discussions with consultants, discussions with primary provider, evaluation of patient's response to treatment, examination of patient, obtaining history from patient or surrogate, ordering and performing treatments and interventions, ordering and review of laboratory studies, ordering and review of radiographic studies, pulse oximetry, re-evaluation of patient's condition and review of old charts    I assumed direction of critical care for this patient from another provider in my specialty: no      Care discussed with: admitting provider                MEDICATIONS GIVEN IN ER  Medications   potassium chloride 10 mEq in 100 mL IVPB (0 mEq Intravenous Stopped 3/10/23 1443)     And   potassium chloride 10 mEq in 100 mL IVPB (10 mEq Intravenous New Bag 3/10/23 1445)     And   potassium chloride 10 mEq in 100 mL IVPB (has no administration in time range)     And   potassium chloride 10 mEq in 100 mL IVPB (has no administration in time range)     And   potassium chloride 10 mEq in  100 mL IVPB (has no administration in time range)     And   potassium chloride 10 mEq in 100 mL IVPB (has no administration in time range)   sodium chloride 0.9 % bolus 500 mL (0 mL Intravenous Stopped 3/10/23 1300)   calcium gluconate 1g/50ml 0.675% NaCl IV SOLN (0 g Intravenous Stopped 3/10/23 1430)   magnesium sulfate in D5W 1g/100mL (PREMIX) (0 g Intravenous Stopped 3/10/23 1447)   ondansetron (ZOFRAN) injection 4 mg (4 mg Intravenous Given 3/10/23 1446)   morphine injection 4 mg (4 mg Intravenous Given 3/10/23 1446)             MEDICAL DECISION MAKING, PROGRESS, and CONSULTS    All labs have been independently reviewed by me.  All radiology studies have been reviewed by me and I have also reviewed the radiology report.   EKG's independently viewed and interpreted by me.  Discussion below represents my analysis of pertinent findings related to patient's condition, differential diagnosis, treatment plan and final disposition.      Additional sources:  - Discussed/ obtained information from independent historians: I had multiple long discussions with the patient's son who is her POA.  He states that at this point with her aphasia she has no quality of life and after prolonged discussion between myself, the son and his wife we have agreed the patient is a DO NOT RESUSCITATE    - External (non-ED) record review: The patient was admitted to the hospital from February 22 through February 28 for acute stroke with history of A-fib and slow ventricular response.  She also has a history of seizure disorder.  Her head CT showed areas of infarction with uncertain age.  The patient was noted to have A-fib with RVR and was seen by cardiology and placed on metoprolol.  It was felt that her stroke was secondary to an embolic event and the patient was put on Lovenox.  The plan was to wean the patient off her Dilantin and start her on Vimpat so they can take off the Lovenox and put her on Eliquis.    - Chronic or social  conditions impacting care: The patient is currently living in a rehab facility and is aphasic from prior stroke    - Shared decision making: After shared decision-making discussion between myself, the patient and the son we agree the patient needs to be admitted for further evaluation and care.      Orders placed during this visit:  Orders Placed This Encounter   Procedures   • Critical Care   • XR Chest 1 View   • CT Head Without Contrast   • CT Chest Without Contrast Diagnostic   • Comprehensive Metabolic Panel   • Urinalysis With Culture If Indicated - Urine, Catheter   • Blood Gas, Arterial -   • BNP   • High Sensitivity Troponin T   • Magnesium   • TSH   • T4, Free   • CK   • Phenytoin Level, Total   • CBC Auto Differential   • Blood Gas, Arterial -   • High Sensitivity Troponin T 2Hr   • Calcium, Ionized   • Potassium   • Urinalysis, Microscopic Only - Urine, Clean Catch   • Monitor Blood Pressure   • Cardiac Monitoring   • Pulse Oximetry, Continuous   • Code Status and Medical Interventions:   • LCG (on-call MD unless specified)   • LIPPS (on-call MD unless specified)   • IP Palliative Care Nurse Consult   • ECG 12 Lead Syncope   • ECG 12 Lead Chest Pain   • Inpatient Admission   • CBC & Differential         Differential diagnosis:  My differential diagnosis includes but is not limited to seizure, stroke, syncope, cardiopulmonary arrest, arrhythmia, hypotension, electrolyte abnormality or vasovagal episode      Independent interpretation of labs, radiology studies, and discussions with consultants:  ED Course as of 03/10/23 1529   Fri Mar 10, 2023   1158 The patient's son is now here who states that the patient has had seizure disorder for a long time and recently has been weaned off her Dilantin and started on Vimpat so that he can change her anticoagulation around for her A-fib. [GP]   1201 On arrival the patient's heart rate is in a normal sinus rhythm in the 60s and her initial blood pressure was 90 and  thus have ordered IV fluid bolus while I will obtain EKG, chest x-ray, labs and head CT for further evaluation. [GP]   1201 EKG    EKG time: 1150  Rhythm/Rate: Normal sinus rhythm at 68  No Acute Ischemia  Non-Specific ST-T changes    Change compared to prior on 2/24/2023 when the patient was in rapid A-fib    Interpreted Contemporaneously by me.  Independently viewed by me     [GP]   1303 The patient's calcium came back at 5.4 with potassium of 2.9 and magnesium of 1.5 I will order an ionized calcium but I will go ahead and give the patient a dose of calcium and IV magnesium and potassium here in the emergency room. [GP]   1305 My independent interpretation of the patient's chest x-ray is no pneumonia or pneumothorax with mild vascular congestion [GP]   1345 I discussed the patient's head CT with Dr. Borges from radiology who states it is unchanged from prior. [GP]   1406 The patient's CT chest shows a sternal fracture and second through fifth rib fractures on both the left and right side.  This is all presumed from her CPR. [GP]   1415 On repeat examination the patient remains hemodynamically stable with a heart rate in the 60s.  I advised the patient and her family of the multiple rib fractures and sternal fracture.  I advised them of the electrolyte abnormalities and that we are replacing them. [GP]   1416 The patient's repeat troponin was 29 with a delta of 13.  I will consult cardiology. [GP]   1416 After long discussion with the patient's son who is her POA and his wife we agree that the patient is a DO NOT RESUSCITATE. [GP]   1417 I will give the patient a dose of morphine for her chest pain which is likely secondary to her multiple rib fractures and sternal fracture.  However I will repeat an EKG as well. [GP]   1425 The patient's ionized calcium is normal. [GP]   1434 EKG #2    EKG time: 1432  Rhythm/Rate: Normal sinus rhythm at 66  No Acute Ischemia  Non-Specific ST-T changes    Similar compared to prior  earlier today    Interpreted Contemporaneously by me.  Independently viewed by me     [GP]   1435 I just spoke with the patient's son again at length who is the patient's POA.  He again reiterates that he would like the patient to be a DO NOT RESUSCITATE and no IV vasopressors.  We have discussed palliative care and he would like a palliative care consult for further information. [GP]   1436 I just discussed the case with Dr. Harman from cardiology who has reviewed the patient's chart.  He is aware of the patient's recent admission and family's wishes for DNR and no vasopressors at this time. [GP]   1525 I discussed the case with Dr. Brown who will admit the patient to a telemetry bed for further evaluation and care.  He is aware of my consultation with cardiology and palliative care. [GP]      ED Course User Index  [GP] Jono Buck MD               DIAGNOSIS  Final diagnoses:   Syncope and collapse   Symptomatic bradycardia   Symptomatic hypotension   Closed fracture of sternum, unspecified portion of sternum, initial encounter   Multiple fractures of ribs, bilateral, init for clos fx   Hypocalcemia   Hypokalemia   Hypomagnesemia   History of seizure disorder   History of atrial fibrillation   Elevated troponin         DISPOSITION  ADMISSION    Discussed treatment plan and reason for admission with pt/family and admitting physician.  Pt/family voiced understanding of the plan for admission for further testing/treatment as needed.            Latest Documented Vital Signs:  As of 15:29 EST  BP- 157/82 HR- 66 Temp-   O2 sat- 98%--      --------------------  Please note that portions of this were completed with a voice recognition program.       Note Disclaimer: At [facility], we believe that sharing information builds trust and better relationships. You are receiving this note because you are receiving care at [facility] or recently visited. It is possible you will see health information before a  provider has talked with you about it. This kind of information can be easy to misunderstand. To help you fully understand what it means for your health, we urge you to discuss this note with your provider.           Jono Buck MD  03/10/23 1529      Electronically signed by Jono Buck MD at 03/10/23 1529     Jerry Hitchcock, RN at 03/10/23 1538          Nursing report ED to floor  Theoshanique Lojaos  83 y.o.  female    HPI :   Chief Complaint   Patient presents with   • Cardiac Arrest       Admitting doctor:   Mauro Brown MD    Admitting diagnosis:   The primary encounter diagnosis was Syncope and collapse. Diagnoses of Symptomatic bradycardia, Symptomatic hypotension, Closed fracture of sternum, unspecified portion of sternum, initial encounter, Multiple fractures of ribs, bilateral, init for clos fx, Hypocalcemia, Hypokalemia, Hypomagnesemia, History of seizure disorder, History of atrial fibrillation, and Elevated troponin were also pertinent to this visit.    Code status:   Current Code Status       Date Active Code Status Order ID Comments User Context       3/10/2023 1439 No CPR (Do Not Attempt to Resuscitate) [id number]  Jono Buck MD ED        Question Answer    Code Status (Patient has no pulse and is not breathing) No CPR (Do Not Attempt to Resuscitate)    Medical Interventions (Patient has pulse or is breathing) Limited Support    Medical Intervention Limits: NO intubation (DNI)     NO antiarrhythmic drugs     NO cardioversion     NO vasopressors    Level Of Support Discussed With Health Care Surrogate    Release to patient Routine Release                    Allergies:   Patient has no known allergies.    Isolation:   No active isolations    Intake and Output    Intake/Output Summary (Last 24 hours) at 3/10/2023 1538  Last data filed at 3/10/2023 1430  Gross per 24 hour   Intake 550 ml   Output --   Net 550 ml       Weight:       03/10/23  1149   Weight: 73.5 kg (162 lb)       Most recent  vitals:   Vitals:    03/10/23 1257 03/10/23 1258 03/10/23 1409 03/10/23 1431   BP:    157/82   BP Location:       Patient Position:       Pulse: 65 67 63 66   Resp:       SpO2: 99% 99% 97% 98%   Weight:       Height:           Active LDAs/IV Access:   Lines, Drains & Airways       Active LDAs       Name Placement date Placement time Site Days    Peripheral IV 03/10/23 1151 Anterior;Left;Upper Arm 03/10/23  1151  Arm  less than 1    Peripheral IV 03/10/23 1156 Anterior;Distal;Left Hand 03/10/23  1156  Hand  less than 1    Peripheral IV 03/10/23 1355 Anterior;Right Hand 03/10/23  1355  Hand  less than 1    Peripheral IV 03/10/23 1415 Right Antecubital 03/10/23  1415  Antecubital  less than 1    External Urinary Catheter 03/10/23  1436  --  less than 1                    Labs (abnormal labs have a star):   Labs Reviewed   COMPREHENSIVE METABOLIC PANEL - Abnormal; Notable for the following components:       Result Value    Creatinine 0.54 (*)     Potassium 2.9 (*)     Chloride 116 (*)     CO2 19.0 (*)     Calcium 5.4 (*)     Total Protein 4.3 (*)     Albumin 2.3 (*)     All other components within normal limits    Narrative:     GFR Normal >60  Chronic Kidney Disease <60  Kidney Failure <15    The GFR formula is only valid for adults with stable renal function between ages 18 and 70.   URINALYSIS W/ CULTURE IF INDICATED - Abnormal; Notable for the following components:    Blood, UA Small (1+) (*)     Protein, UA 30 mg/dL (1+) (*)     All other components within normal limits    Narrative:     In absence of clinical symptoms, the presence of pyuria, bacteria, and/or nitrites on the urinalysis result does not correlate with infection.   TROPONIN - Abnormal; Notable for the following components:    HS Troponin T 16 (*)     All other components within normal limits    Narrative:     High Sensitive Troponin T Reference Range:  <10.0 ng/L- Negative Female for AMI  <15.0 ng/L- Negative Male for AMI  >=10 - Abnormal Female  indicating possible myocardial injury.  >=15 - Abnormal Male indicating possible myocardial injury.   Clinicians would have to utilize clinical acumen, EKG, Troponin, and serial changes to determine if it is an Acute Myocardial Infarction or myocardial injury due to an underlying chronic condition.        MAGNESIUM - Abnormal; Notable for the following components:    Magnesium 1.5 (*)     All other components within normal limits   PHENYTOIN LEVEL, TOTAL - Abnormal; Notable for the following components:    Phenytoin Level <0.8 (*)     All other components within normal limits   CBC WITH AUTO DIFFERENTIAL - Abnormal; Notable for the following components:    Monocyte % 15.8 (*)     Immature Grans % 1.8 (*)     Immature Grans, Absolute 0.09 (*)     All other components within normal limits   BLOOD GAS, ARTERIAL - Abnormal; Notable for the following components:    Base Excess, Arterial -0.9 (*)     All other components within normal limits   HIGH SENSITIVITIY TROPONIN T 2HR - Abnormal; Notable for the following components:    HS Troponin T 29 (*)     Troponin T Delta 13 (*)     All other components within normal limits    Narrative:     High Sensitive Troponin T Reference Range:  <10.0 ng/L- Negative Female for AMI  <15.0 ng/L- Negative Male for AMI  >=10 - Abnormal Female indicating possible myocardial injury.  >=15 - Abnormal Male indicating possible myocardial injury.   Clinicians would have to utilize clinical acumen, EKG, Troponin, and serial changes to determine if it is an Acute Myocardial Infarction or myocardial injury due to an underlying chronic condition.        URINALYSIS, MICROSCOPIC ONLY - Abnormal; Notable for the following components:    RBC, UA 3-5 (*)     Bacteria, UA Trace (*)     All other components within normal limits   BNP (IN-HOUSE) - Normal    Narrative:     Among patients with dyspnea, NT-proBNP is highly sensitive for the detection of acute congestive heart failure. In addition NT-proBNP of  <300 pg/ml effectively rules out acute congestive heart failure with 99% negative predictive value.    Results may be falsely decreased if patient taking Biotin.     TSH - Normal   T4, FREE - Normal    Narrative:     Results may be falsely increased if patient taking Biotin.     CK - Normal   CALCIUM, IONIZED - Normal   BLOOD GAS, ARTERIAL   CBC AND DIFFERENTIAL    Narrative:     The following orders were created for panel order CBC & Differential.  Procedure                               Abnormality         Status                     ---------                               -----------         ------                     CBC Auto Differential[[id number]]        Abnormal            Final result                 Please view results for these tests on the individual orders.       EKG:   ECG 12 Lead Chest Pain   Final Result   HEART RATE= 66  bpm   RR Interval= 909  ms   W[place] Interval= 241  ms   P Horizontal Axis= -36  deg   P Front Axis= 58  deg   QRSD Interval= 96  ms   QT Interval= 394  ms   QRS Axis= 82  deg   T Wave Axis= 57  deg   - ABNORMAL ECG -   Sinus rhythm   Prolonged W[place] interval   Borderline right axis deviation   No change from previous tracing   Electronically Signed By: Bossman Brumfield ([facility]) 10-Mar-2023 15:13:55   Date and Time of Study: 2023-03-10 14:32:29      ECG 12 Lead Syncope   Final Result   HEART RATE= 68  bpm   RR Interval= 882  ms   WY Interval= 217  ms   P Horizontal Axis= 27  deg   P Front Axis= -40  deg   QRSD Interval= 96  ms   QT Interval= 399  ms   QRS Axis= 74  deg   T Wave Axis= 43  deg   - BORDERLINE ECG -   Sinus rhythm- new    Borderline prolonged WY interval   Non-Specific STT wave changes   Electronically Signed By: Johnson Frausto ([facility]) 10-Mar-2023 14:08:12   Date and Time of Study: 2023-03-10 11:50:58          Meds given in ED:   Medications   potassium chloride 10 mEq in 100 mL IVPB (0 mEq Intravenous Stopped 3/10/23 9033)     And   potassium chloride 10 mEq in 100 mL IVPB (10 mEq  Intravenous New Bag 3/10/23 1445)     And   potassium chloride 10 mEq in 100 mL IVPB (has no administration in time range)     And   potassium chloride 10 mEq in 100 mL IVPB (has no administration in time range)     And   potassium chloride 10 mEq in 100 mL IVPB (has no administration in time range)     And   potassium chloride 10 mEq in 100 mL IVPB (has no administration in time range)   sodium chloride 0.9 % bolus 500 mL (0 mL Intravenous Stopped 3/10/23 1300)   calcium gluconate 1g/50ml 0.675% NaCl IV SOLN (0 g Intravenous Stopped 3/10/23 1430)   magnesium sulfate in D5W 1g/100mL (PREMIX) (0 g Intravenous Stopped 3/10/23 1447)   ondansetron (ZOFRAN) injection 4 mg (4 mg Intravenous Given 3/10/23 1446)   morphine injection 4 mg (4 mg Intravenous Given 3/10/23 1446)       Imaging results:  CT Head Without Contrast    Result Date: 3/10/2023   There is no CT evidence to suggest acute intracranial pathology. Again noted is a late subacute to chronic infarct within lenticulostriate distribution involving the left caudate head, the anterior limb of the left internal capsule, and the anterior aspect of the left lentiform nucleus. No significant interval change is seen when compared to the prior study. Additionally, there are findings compatible with a chronic infarct within the lateral aspect of the right inferior parietal lobule within the right MCA distribution. Subcentimeter chronic infarct is seen within the left cerebellar hemisphere within the left superior cerebellar artery distribution.  Radiation dose reduction techniques were utilized, including automated exposure control and exposure modulation based on body size.        Ambulatory status:   - Bed bound 8 rib fx    Social issues:   Social History     Socioeconomic History   • Marital status:    Tobacco Use   • Smoking status: Never     Passive exposure: Never   • Smokeless tobacco: Never   Vaping Use   • Vaping Use: Never used   Substance and Sexual  Activity   • Alcohol use: Never   • Drug use: Never   • Sexual activity: Defer       NIH Stroke Scale:         Registered Nurse, RN  03/10/23 15:38 EST         Electronically signed by Jerry Hitchcock RN at 03/10/23 1538              Consult Notes (all)      Dayne Honeycutt MD at 23 0651      Consult Orders    1. [facility] (on-call MD unless specified) [[id number]] ordered by Jono Buck MD at 03/10/23 1417               Date of Hospital Visit: 23  Encounter Provider: Dayne Honeycutt MD  Place of Service: [facility] CARDIOLOGY  Patient Name: Ina Bryant  :1940  Referral Provider: Mauro Brown MD    Chief complaint: Syncope, bradycardia    History of Present Illness    Ina Bryant is a 83 y.o. female who follows Dr. Verma in the outpatient clinic.    Patient has history for hypothyroidism, GERD, HLD, atrial fibrillation, CVA (aphasia)    Review of medical record reveals patient hospitalized  - 2023 for acute CVA with history of A-fib with RVR.  Patient also notes history of seizure disorder.  Patient was evaluated by cardiology services and placed on metoprolol.  It was thought that CVA was secondary to embolic event and patient was placed on Lovenox with plan to transition to apixaban.    Patient presents to the [facility] emergency department from rehab center secondary to syncopal episode and then a witnessed seizure.  Nursing home reports that patient was without pulse and several minutes of CPR were performed.  EMS reports when they arrived patient had pulse present.  Patient is nearly aphasic from previous stroke.  Per EMS patient bradycardic with heart rate in the 30s and hypotensive with systolic blood pressure in the 80s.  Patient received dose of atropine with improvement of heart rate to the 60s and improvement of blood pressure to the 90s.  Per EDMD discussion with patient's son who is her POA he reports that she has no quality  of life and is a DO NOT RESUSCITATE.    Her daughter-in-law is present in the room with her.  She is uncomfortable due to chest pain.         Past Medical History:   Diagnosis Date   • Acid reflux    • Elevated cholesterol    • Gallbladder abscess        Past Surgical History:   Procedure Laterality Date   • ADENOIDECTOMY     • CHOLECYSTECTOMY     • THYROID SURGERY     • TONSILLECTOMY         Medications Prior to Admission   Medication Sig Dispense Refill Last Dose   • albuterol (PROVENTIL HFA) 108 (90 BASE) MCG/ACT inhaler Inhale 2 puffs Every 4 (Four) Hours As Needed for Wheezing or Shortness of Air. 1 inhaler 0 3/10/2023   • apixaban (ELIQUIS) 5 MG tablet tablet Take 1 tablet by mouth Every 12 (Twelve) Hours. Indications: Atrial Fibrillation 60 tablet  3/10/2023   • atorvastatin (LIPITOR) 40 MG tablet Take 1 tablet by mouth Every Night. 90 tablet  Past Week   • cetirizine (zyrTEC) 10 MG tablet Take 1 tablet by mouth Every Evening.   Past Week   • lacosamide (VIMPAT) 100 MG tablet tablet Take 1 tablet by mouth 2 (Two) Times a Day.   3/10/2023   • lansoprazole (PREVACID) 15 MG capsule Take 1 capsule by mouth Daily.   Past Week   • metoprolol tartrate 37.5 MG tablet Take 37.5 mg by mouth Every 12 (Twelve) Hours.   3/10/2023       Current Meds  Scheduled Meds:apixaban, 5 mg, Oral, Q12H  atorvastatin, 40 mg, Oral, Nightly  lacosamide, 100 mg, Oral, BID  metoprolol tartrate, 12.5 mg, Oral, Q12H  pantoprazole, 40 mg, Oral, Q AM      Continuous Infusions:sodium chloride, 75 mL/hr      PRN Meds:.•  albuterol  •  HYDROcodone-acetaminophen  •  Morphine  •  ondansetron    Allergies as of 03/10/2023   • (No Known Allergies)       Social History     Socioeconomic History   • Marital status:    Tobacco Use   • Smoking status: Never     Passive exposure: Never   • Smokeless tobacco: Never   Vaping Use   • Vaping Use: Never used   Substance and Sexual Activity   • Alcohol use: Never   • Drug use: Never   • Sexual activity:  "Defer       Family History   Problem Relation Age of Onset   • No Known Problems Mother    • No Known Problems Father        Review of Systems   Unable to perform ROS: patient nonverbal           Objective:   Temp:  [97.7 °F (36.5 °C)-98.2 °F (36.8 °C)] 97.7 °F (36.5 °C)  Heart Rate:  [61-74] 72  Resp:  [16] 16  BP: (121-174)/(64-97) 163/81  Body mass index is 29.63 kg/m².  Flowsheet Rows    Flowsheet Row First Filed Value   Admission Height 157.5 cm (62\") Documented at 03/10/2023 1149   Admission Weight 73.5 kg (162 lb) Documented at 03/10/2023 1149        Vitals:    03/11/23 0726   BP: 163/81   Pulse: 72   Resp: 16   Temp: 97.7 °F (36.5 °C)   SpO2: 96%       General Appearance:    In mild to moderate distress, sleeping, does not respond to my presence   Head:    Normocephalic,  atraumatic       Ears:    Ears appear intact with no abnormalities noted   Throat:   No oral lesions, no thrush, oral mucosa moist   Neck:   trachea midline,        Lungs:     respirations regular, even and unlabored    Heart:    Regular rhythm and normal rate no murmur,    Chest Wall:    Bruising, discomfort to touch   Abdomen:    non-distended, no guarding, no rebound  tenderness           Skin:  Psychiatric:   No bleeding, bruising or rash    Does not respond to light stimuli                 Lab Review:      Results from last 7 days   Lab Units 03/11/23  1109 03/11/23  0429 03/10/23  1152   SODIUM mmol/L  --  126* 142   POTASSIUM mmol/L 4.5 5.0  5.0 2.9*   CHLORIDE mmol/L  --  97* 116*   CO2 mmol/L  --  18.2* 19.0*   BUN mg/dL  --  16 10   CREATININE mg/dL  --  0.83 0.54*   CALCIUM mg/dL  --  8.4* 5.4*   BILIRUBIN mg/dL  --   --  0.2   ALK PHOS U/L  --   --  88   ALT (SGPT) U/L  --   --  11   AST (SGOT) U/L  --   --  15   GLUCOSE mg/dL  --  124* 92     Results from last 7 days   Lab Units 03/11/23  1109 03/10/23  1336 03/10/23  1152   CK TOTAL U/L  --   --  51   HSTROP T ng/L 178* 29* 16*     @Annette@  Results from last 7 days   Lab " Units 03/11/23  0429 03/10/23  1152   WBC 10*3/mm3 7.25 5.13   HEMOGLOBIN g/dL 12.7 12.3   HEMATOCRIT % 38.6 36.5   PLATELETS 10*3/mm3 229 244         Results from last 7 days   Lab Units 03/11/23  1109   MAGNESIUM mg/dL 2.2         I personally viewed and interpreted the patient's EKG/Telemetry data        Patient Active Problem List   Diagnosis   • Ear ache   • Fever   • Chest congestion   • Atrial fibrillation with slow ventricular response (HCC)   • Acid reflux   • Elevated cholesterol   • Seizures (HCC)   • Acute CVA (cerebrovascular accident) ([facility])   • Syncope and collapse     Assessment and Plan:    I think syncope due to orthostatic hypotension, vagal syncope, or possible tachy nirav syndrome with conversion pause are most likely reason for LOC.      I think this is more likely that cardiac arrest due to arrhythmia or PEA given history.     I don't feel any intervention would be appropriate at this time given situation and discussion regarding palliative care.     If situation changes, please call.     Dayne Honeycutt MD  03/11/23  11:57 EST.  Time spent in reviewing chart, discussion and examination:             Electronically signed by Dayne Honeycutt MD at 03/11/23 3573

## 2023-03-13 NOTE — CASE MANAGEMENT/SOCIAL WORK
Case Management Discharge Note      Final Note: patient  on 3/12/2023         Selected Continued Care - Discharged on 3/12/2023 Admission date: 3/10/2023 - Discharge disposition:     Destination    No services have been selected for the patient.           Durable Medical Equipment    No services have been selected for the patient.           Dialysis/Infusion    No services have been selected for the patient.           Home Medical Care    No services have been selected for the patient.           Therapy    No services have been selected for the patient.           Community Resources    No services have been selected for the patient.           Community & DME    No services have been selected for the patient.             Selected Continued Care - Prior Encounters Includes continued care and service providers with selected services from prior encounters from 12/10/2022 to 3/12/2023    Discharged on 2023 Admission date: 2023 - Discharge disposition: Skilled Nursing Facility (DC - External)    Destination     Service Provider Selected Services Address Phone Fax Patient Preferred    [facility] [address] [address] [phone] --                         Final Discharge Disposition Code: 41 -  in medical facility

## 2023-03-22 NOTE — PROGRESS NOTES
"Enter Query Response Below      Query Response:       Unable to determine Electronically signed by Mauro Brown MD, 23, 1:58 PM EDT.         If applicable, please update the problem list.     Patient: Julia Bryant        : 1940  Account: [id number]           Admit Date: 3/10/2023        How to Respond to this query:       a. Click New Note     b. Answer query within the yellow box.                c. Update the Problem List, if applicable.      If you have any questions about this query contact me at: [email or web address]    Dr. Brown    Presented to Emergency Department after successful resuscitation from syncopal episode followed by cardiopulmonary arrest. Cardiology Consult documented, \"I think syncope due to orthostatic hypotension, vagal syncope, or possible tachy nirav syndrome with conversion pause are most likely reason for LOC.  I think this is more likely that cardiac arrest due to arrhythmia or PEA given history. \" Treatment included electrolyte replacement and cardiology consult prior to transition to palliative care.    After study, can the etiology of the cardiac arrest be clarified as:  Cardiac arrest likely due to arrhythmia or PEA  Cardiac arrest due to other (please specify)  Other (please specify)  Unable to determine    By submitting this query, we are merely seeking further clarification of documentation to accurately reflect all conditions that you are monitoring, evaluating, treating or that extend the hospitalization or utilize additional resources of care. Please utilize your independent clinical judgment when addressing the question(s) above.     This query and your response, once completed, will be entered into the legal medical record.    Sincerely,  Berta GARVEY RN, CCDS  Clinical Documentation Integrity Program   [email or web address]  "

## 2023-03-22 NOTE — PROGRESS NOTES
"Enter Query Response Below      Query Response:     Unable to determine Electronically signed by Mauro Brown MD, 23, 1:58 PM EDT.           If applicable, please update the problem list.     Patient: Julia Bryant        : 1940  Account: [id number]           Admit Date: 3/10/2023        How to Respond to this query:       a. Click New Note     b. Answer query within the yellow box.                c. Update the Problem List, if applicable.    If you have any questions about this query contact me at:  [email or web address]    Dr. Brown    Per History & Physical, patient experienced a syncopal episode followed by cardiopulmonary arrest leading to her presentation at the Emergency Department (ED.) Cardiology Consult documented, \"I think syncope due to orthostatic hypotension, vagal syncope, or possible tachy nirav syndrome with conversion pause are most likely reason for LOC. \" ED notes document that in route, EMS reported patient was bradycardic in the 30s and hypotensive in the 80s. EMS administered a dose of atropine with improvement of heart rate to 60 and blood pressure to 90. Treatment during admission included IV fluids and electrolyte replacement before transition to palliative care.    After study, can the etiology of syncope be clarified as:  Syncope likely due to orthostatic hypotension, vagal syncope, or tachy nirav syndrome  Syncope due to (please specify)  Other  Unable to determine      By submitting this query, we are merely seeking further clarification of documentation to accurately reflect all conditions that you are monitoring, evaluating, treating or that extend the hospitalization or utilize additional resources of care. Please utilize your independent clinical judgment when addressing the question(s) above.     This query and your response, once completed, will be entered into the legal medical record.    Sincerely,  Berta GARVEY RN, [facility]  Clinical Documentation Integrity Program "   [email or web address]

## 2023-03-22 NOTE — PROGRESS NOTES
"Enter Query Response Below      Query Response:     Multiple rib fractures secondary to CPR Electronically signed by Mauro Brown MD, 23, 1:39 PM EDT.           If applicable, please update the problem list.     Patient: Julia Bryant        : 1940  Account: [id number]           Admit Date: 3/10/2023        How to Respond to this query:       a. Click New Note     b. Answer query within the yellow box.                c. Update the Problem List, if applicable.      If you have any questions about this query contact me at: [email or web address]    Dr. Brown    Per History & Physical, patient experienced a syncopal episode followed by cardiopulmonary arrest leading to her presentation at the Emergency Department (ED.) 3/10 chest ray findings include, \"There is a slightly displaced fracture involving the distal-third of the sternum with very small substernal hematoma that measures 1 cm in AP diameter, 1.5 cm in transverse diameter, and extends along the posterior margin of the sternum for a distance of 4.8 cm. There are also nondisplaced fractures involving the anterior aspects of the left and right 2nd through 5th ribs.\" ED Physician documented the fractures are, \"all presumed from her CPR.\" Treatment included transition to palliative care.    After study, can the etiology of the sternal and multiple rib fractures be clarified as:  Sternal and bilateral 2nd through 5th rib fractures related to Cardiopulmonary Resuscitation (CPR)  Sternal and bilateral 2nd through 5th rib fractures due to (Please specify) __________________  Other  Unable to determine    By submitting this query, we are merely seeking further clarification of documentation to accurately reflect all conditions that you are monitoring, evaluating, treating or that extend the hospitalization or utilize additional resources of care. Please utilize your independent clinical judgment when addressing the question(s) above.     This query and your " response, once completed, will be entered into the legal medical record.    Sincerely,  Berta GARVEY RN, [facility]  Clinical Documentation Integrity Program   [email or web address]

## 2023-03-22 NOTE — PROGRESS NOTES
"Enter Query Response Below      Query Response:       Electrolyte imbalance and elevated troponin Electronically signed by Mauro Brown MD, 23, 1:57 PM EDT.           If applicable, please update the problem list.     Patient: Julia Bryant        : 1940  Account: [id number]           Admit Date: 3/10/2023        How to Respond to this query:       a. Click New Note     b. Answer query within the yellow box.                c. Update the Problem List, if applicable.      If you have any questions about this query contact me at: [email or web address]    Dr. Brown    Discharge Summary documents \"work-up in ER revealed electrolyte imbalance elevated troponin.\"   Abnormal (per reference range) labs:  3/10:   Magnesium           1.5               Creatinine             0.54               Potassium              2.9               Chloride                116               CO2                       19               Calcium                 5.4               Total Protein          4.3               Albumin                 2.3               HS Troponin T      16 and 29               Troponin T Delta  13  3/11:   HS Troponin T       178  Treatment included IV fluids, potassium IVm calcium gluconate IV, and magnesium sulfate IV.     After study, please provide appropriate clinical diagnoses for the \"electrolyte imbalance elevated troponin.\"    ___________________________________________________________________________________________    By submitting this query, we are merely seeking further clarification of documentation to accurately reflect all conditions that you are monitoring, evaluating, treating or that extend the hospitalization or utilize additional resources of care. Please utilize your independent clinical judgment when addressing the question(s) above.     This query and your response, once completed, will be entered into the legal medical record.    Sincerely,  Berta GARVEY RN, CCDS  Clinical Documentation " Integrity Program   [email or web address]